# Patient Record
Sex: FEMALE | Race: WHITE | NOT HISPANIC OR LATINO | Employment: OTHER | URBAN - METROPOLITAN AREA
[De-identification: names, ages, dates, MRNs, and addresses within clinical notes are randomized per-mention and may not be internally consistent; named-entity substitution may affect disease eponyms.]

---

## 2018-05-22 PROBLEM — E03.9 ACQUIRED HYPOTHYROIDISM: Status: ACTIVE | Noted: 2018-05-22

## 2018-05-22 PROBLEM — G62.0 NEUROPATHY DUE TO CHEMOTHERAPEUTIC DRUG: Status: ACTIVE | Noted: 2018-05-22

## 2018-05-22 PROBLEM — I10 HYPERTENSION, WELL CONTROLLED: Status: ACTIVE | Noted: 2018-05-22

## 2018-05-22 PROBLEM — K64.9 HEMORRHOIDS: Status: ACTIVE | Noted: 2018-05-22

## 2018-05-22 PROBLEM — T45.1X5A NEUROPATHY DUE TO CHEMOTHERAPEUTIC DRUG: Status: ACTIVE | Noted: 2018-05-22

## 2018-05-22 PROBLEM — G47.00 INSOMNIA: Status: ACTIVE | Noted: 2018-05-22

## 2019-05-27 ENCOUNTER — CLINICAL SUPPORT (OUTPATIENT)
Dept: REHABILITATION | Facility: HOSPITAL | Age: 84
End: 2019-05-27
Payer: MEDICARE

## 2019-05-27 DIAGNOSIS — R29.898 DECREASED STRENGTH OF LOWER EXTREMITY: ICD-10-CM

## 2019-05-27 DIAGNOSIS — R26.89 BALANCE PROBLEM: ICD-10-CM

## 2019-05-27 PROCEDURE — 97163 PT EVAL HIGH COMPLEX 45 MIN: CPT | Mod: PN

## 2019-05-28 PROBLEM — R26.89 BALANCE PROBLEM: Status: ACTIVE | Noted: 2019-05-28

## 2019-05-28 PROBLEM — R29.898 DECREASED STRENGTH OF LOWER EXTREMITY: Status: ACTIVE | Noted: 2019-05-28

## 2019-05-29 NOTE — PLAN OF CARE
[facility] Outpatient Physical Therapy Initial Evaluation    Name: Antonia Maldonado  Clinic Number: [id number]    Antonia is a 88 y.o. female evaluated on 5/27/2019.     Diagnosis:   Encounter Diagnoses   Name Primary?    Decreased strength of lower extremity     Balance problem      Physician: Ezekiel Wang MD  Treatment Orders: PT Eval and Treat - Restore Functional ADL Training    Past Medical History:   Diagnosis Date    Adenocarcinoma, breast     Anemia     Anxiety and depression     Breast cancer     Depression     Hearing loss     Hypertension     Hypothyroidism     Sleep apnea      Review of patient's allergies indicates:  No Known Allergies  Precautions: Fall  Occupation: Retired     Envoirnmental concerns: none. Pt lives alone with one flight of stairs. Daugther and son each live within 10 minutes of her.   Cultural, Spiritual, Developmental and Educational concerns:none  Abuse/Neglect, Nutritional concerns: none    Subjective  Pt reports to clinic with B numbness to feet. She reports two falls off of the toilet and not sure if correlated with being tired or LE issues; she did hit her head each time with contusions but not MD visit. She reports feeling more secure while walking around grocery store.     Diagnostic Tests: none in chart for this condition    Pain Scale: Antonia denies pain.     Patient Goals: increase confidence. Prickling to legs would go away.     Objective  Observation: Pt is 88 year old WD, WN female who is alert and oriented x 3. No hearing aides today, difficulty hearing. Hearing aides are getting new batteries and she will have for next visit.     Posture: forward flexed at hips with decreased postural awareness.     MMT Hips  Flex R 5/5; L 4/5  Ext R 5/5; L 4/5  abd R 4+/5; 4-/5     MMT knees  Flex 5/5  Ext 5/5 but with cramping to B quads    MMT ankles  DF 4+/5 B    Alexandre Balance Scale: 47/56    Joint Mobility: not assessed  Palpation: none  Sensation: impaired to  light touch    PT Evaluation Completed? Yes  Discussed Plan of Care with patient: Yes    Written Home Exercises Provided: None today. Will distribute at next visit. (clamshell, supine march, heel raises)     History  Co-morbidities and personal factors that may impact the plan of care Examination  Body Structures and Functions, activity limitations and participation restrictions that may impact the plan of care    Clinical Presentation   Co-morbidities:   advanced age and HTN        Personal Factors:   no deficits Body Regions:   lower extremities    Body Systems:    strength  balance  gait            Participation Restrictions:   Incontinence issues require her appts to be 3 pm or after.      Activity limitations:   Learning and applying knowledge  no deficits    General Tasks and Commands  no deficits    Communication  hard of hearing if not wearing hearing aides    Mobility  walking    Self care  toileting  dressing    Domestic Life  doing house work (cleaning house, washing dishes, laundry)    Interactions/Relationships  no deficits    Life Areas  no deficits    Community and Social Life  community life         unstable clinical presentation with unpredictable characteristics                      high   high  high Decision Making/ Complexity Score:  high     CMS Impairment/Limitation/Restriction for FOTO Vertigo Survey  Status Limitation G-Code CMS Severity Modifier  Intake 56% 44% Current Status CK - At least 40 percent but less than 60 percent  Predicted 70% 30% Goal Status+ CJ - At least 20 percent but less than 40 percent    Assessment  This is a 88 y.o. female referred to outpatient physical therapy and presents with a physical therapy diagnosis of   Encounter Diagnoses   Name Primary?    Decreased strength of lower extremity     Balance problem     and demonstrates limitations as described in the problem list. Pt will benefit from physical therapy services in order to maximize pain free and/or functional  use of cervical spine. The following goals were discussed with the patient and patient is in agreement with them as to be addressed in the treatment plan.     Problem List: decreased strength and decreased balance and stability.    Short Term Goals:  4 weeks  1. Pt will present with increased hip strength on L by one half grade to improve stability with ambulation in home with decreased use of holding walls for stability when walking through home.  2. Pt will present with increased ankle strength into DF by one half grade for increased stability with ambulation  3. Pt will present with increased Alexandre balance scale score by 4 points for improved safety with ambulation.   4. Pt will report increased stability and decreased fear with ambulating in grocery store with decreased reliance on grocery cart.      Long Term Goals: 8 weeks  1. Pt will present with increased hip strength on L by one full grade to improve stability with ambulation in home with decreased use of holding walls for stability when walking through home.  2. Pt will present with increased ankle strength into DF by one full grade for increased stability with ambulation  3. Pt will present with increased Alexandre balance scale score by 8 points for improved safety with ambulation.   4. Pt will report increased confidence with walking in the community to grocery shop.     5. Pt will be independent with HEP and self management of symptoms.     Plan  Pt will be treated by physical therapy 2 times a week for 8 weeks for designated treatment time frame to address therapeutic exercises, stretching, stretching, balance, proprioception, pt education, home exercise program, and any other skilled physical therapy technique deemed necessary in order to achieve established goals. Antonia may at times be seen by a PTA as part of the Rehab Team.     Suzan Meneses, DPT      I certify the need for these services furnished under this plan of treatment and while under my care.          ___________________________________  Physician/Referring Practitioner        _________________  Date of Signature

## 2019-05-30 ENCOUNTER — CLINICAL SUPPORT (OUTPATIENT)
Dept: REHABILITATION | Facility: HOSPITAL | Age: 84
End: 2019-05-30
Payer: MEDICARE

## 2019-05-30 DIAGNOSIS — R26.89 BALANCE PROBLEM: ICD-10-CM

## 2019-05-30 DIAGNOSIS — R29.898 DECREASED STRENGTH OF LOWER EXTREMITY: ICD-10-CM

## 2019-05-30 PROCEDURE — 97110 THERAPEUTIC EXERCISES: CPT | Mod: PN

## 2019-05-30 NOTE — PROGRESS NOTES
"  Physical Therapy Daily Treatment Note     Name: Antonia Maldonado  Clinic Number: [id number]    Therapy Diagnosis:   Encounter Diagnoses   Name Primary?    Decreased strength of lower extremity     Balance problem      Physician: Ezekiel Wang MD    Visit Date: 5/30/2019  Physician Orders: PT Eval and Treat  Medical Diagnosis: Hypertension, Balance Disorder  Evaluation Date: 5/27/2019  Authorization Period Expiration: 12/31/2019  Plan of Care Certification Period: 7/22/2019  Visit #/Visits authorized: 2 / 20     Time In: 3:25 (arrived late due to getting lost)  Time Out: 4:10  Total Billable Time: 30 minutes  Total Treatment Time: 40 minute    Precautions: Fall, HTN    Subjective     Pt reports: tingling into B lower legs from the knees down. States she was told it is curable and is very excited about it. States she feels a little flustered currently because she got lost coming in to therapy today. Hearing aids are still being repaired and does not have them today, asks to be spoken to clearly and a little slower for best understanding.  She was compliant with home exercise program.  Response to previous treatment: no change as there were no exercises  Functional change: none    Pain: 4/10 tingling  Location: bilateral feet      Objective     Antonia received therapeutic exercises to develop strength, endurance, ROM and flexibility for 40 minutes including:  Supine glut set 10 x 5"  Clamshell supine with YTB x 10  Supine march x 10 each LE, alternating  Modified fig-4 piriformis stretch 3x20" each  Heel raises x 10  Sidesteps x 2 laps with UE support in // bars    To add next session: Neuromuscular re-education activities to improve: Balance, Coordination, Sense and Proprioception      Home Exercises Provided and Patient Education Provided     Education provided:   - emphasis on keeping track of time with stretches and repetitions with exercises to avoid over-straining or over-stretching.    Written Home Exercises " Provided: yes. See Patient Instructions for 05/31/2019 in Notes section of EMR.  Exercises were reviewed and Antonia was able to demonstrate them prior to the end of the session.  Antonia demonstrated good  understanding of the education provided.     Assessment     Antonia demonstrated overall good tolerance to treatment this date without exacerbation of symptoms. She continues with tingling into B lower legs throughout session with no changes of tingling. She required frequent cues to maintain proper technique and stay on task. Patient is verbose and occasionally strays off task, needing cues to resume exercise; this may be due to being more flustered from getting lost on the way to therapy. Patient presents with moderate LE weakness and will benefit from strength and balance training as tolerated.  Antonia is progressing well towards her goals.   Pt prognosis is Good.     Pt will continue to benefit from skilled outpatient physical therapy to address the deficits listed in the problem list box on initial evaluation, provide pt/family education and to maximize pt's level of independence in the home and community environment.     Pt's spiritual, cultural and educational needs considered and pt agreeable to plan of care and goals.    Anticipated barriers to physical therapy: none    Goals:   Short Term Goals:  4 weeks  1. Pt will present with increased hip strength on L by one half grade to improve stability with ambulation in home with decreased use of holding walls for stability when walking through home.  2. Pt will present with increased ankle strength into DF by one half grade for increased stability with ambulation  3. Pt will present with increased Alexandre balance scale score by 4 points for improved safety with ambulation.   4. Pt will report increased stability and decreased fear with ambulating in grocery store with decreased reliance on grocery cart.       Long Term Goals: 8 weeks  1. Pt will present with increased hip  strength on L by one full grade to improve stability with ambulation in home with decreased use of holding walls for stability when walking through home.  2. Pt will present with increased ankle strength into DF by one full grade for increased stability with ambulation  3. Pt will present with increased Alexandre balance scale score by 8 points for improved safety with ambulation.   4. Pt will report increased confidence with walking in the community to grocery shop.     5. Pt will be independent with HEP and self management of symptoms.     Plan     Pt to continue strength and balance training progression within current POC toward established PT goals.    Maureen Lambert, PTA

## 2019-06-04 ENCOUNTER — CLINICAL SUPPORT (OUTPATIENT)
Dept: REHABILITATION | Facility: HOSPITAL | Age: 84
End: 2019-06-04
Payer: MEDICARE

## 2019-06-04 DIAGNOSIS — R29.898 DECREASED STRENGTH OF LOWER EXTREMITY: ICD-10-CM

## 2019-06-04 DIAGNOSIS — R26.89 BALANCE PROBLEM: ICD-10-CM

## 2019-06-04 PROCEDURE — 97110 THERAPEUTIC EXERCISES: CPT | Mod: PN

## 2019-06-04 PROCEDURE — 97112 NEUROMUSCULAR REEDUCATION: CPT | Mod: PN

## 2019-06-04 NOTE — PROGRESS NOTES
"  Physical Therapy Daily Treatment Note     Name: Antonia Maldonado  Clinic Number: [id number]    Therapy Diagnosis:   Encounter Diagnoses   Name Primary?    Decreased strength of lower extremity     Balance problem      Physician: Ezekiel Wang MD    Visit Date: 6/4/2019  Physician Orders: PT Eval and Treat  Medical Diagnosis: Hypertension, Balance Disorder  Evaluation Date: 5/27/2019  Authorization Period Expiration: 12/31/2019  Plan of Care Certification Period: 7/22/2019  Visit #/Visits authorized: 3 / 20     Time In: 3:08  Time Out: 4:10  Total Billable Time: 30 minutes  Total Treatment Time: 60 minutes    Precautions: Fall, HTN    Subjective     Pt reports: continued tingling into B lower legs from the knees down. Arrives this date with some question as to why she is in PT, and if she should hold off until she sees her Internist, Dr. Granda. Upon discussion with patient in regards to benefits to PT and rationale to strengthen LE's as well as add balance training, patient understands importance to remain in PT as well as schedule a follow up with Dr. Granda. Also reports her hearing aids are still in the shop.  She was compliant with home exercise program.  Response to previous treatment: no change as there were no exercises  Functional change: none    Pain: 4/10 tingling  Location: bilateral feet      Objective     Antonia received therapeutic exercises to develop strength, endurance, ROM and flexibility for 50 minutes including:  Supine glut set 10 x 5"  Clamshell supine with YTB 2 x 10  Modified fig-4 piriformis stretch 3x20" each  Supine march x 10 each LE, alternating  Heel raises 2 x 10  Sidesteps x 3 laps with UE support in // bars    Discussion and education with patient in depth regarding the benefits and importance to strength and balance training in physical therapy including but not limited to, stability with walking, safety with navigating unsteady surfaces or dark rooms, endurance and strength with " daily activities like cooking, laundry, etc.    Pt received Neuromuscular re-education activities to improve: Balance, Coordination, Sense and Proprioception for 10 minutes including:  Normal IRENE on floor EO and EC, x 30 sec each  Narrow IRENE on floor EO and EC, x 30 sec each  Normal IRENE on airex foam EO x 30 sec  Normal IRENE on airex foam with UE flexion x 10      Home Exercises Provided and Patient Education Provided     Education provided:   - emphasis on keeping track of time with stretches and repetitions with exercises to avoid over-straining or over-stretching.    Written Home Exercises Provided: yes. See Patient Instructions for 06/04/2019 in Notes section of EMR.  Exercises were reviewed and Antonia was able to demonstrate them prior to the end of the session.  Antonia demonstrated good  understanding of the education provided.     Assessment     Antonia presents with improved tolerance to mat exercises this date however continues with appropriate muscle fatigue with all exercises. Pt with difficulty multi-tasking of counting repetitions and hold times, occasional verbal cues to maintain focal point with balance activities. Patient's distractions to surroundings may contribute to fall risk. No changes noted in lower leg tingling throughout session. Mild sway with narrow IRENE which increases with eyes closed while balancing. She will benefit from continued balance and strength training.   Antonia is progressing well towards her goals.   Pt prognosis is Good.     Pt will continue to benefit from skilled outpatient physical therapy to address the deficits listed in the problem list box on initial evaluation, provide pt/family education and to maximize pt's level of independence in the home and community environment.     Pt's spiritual, cultural and educational needs considered and pt agreeable to plan of care and goals.    Anticipated barriers to physical therapy: none    Goals:   Short Term Goals:  4 weeks  1. Pt will  present with increased hip strength on L by one half grade to improve stability with ambulation in home with decreased use of holding walls for stability when walking through home.  2. Pt will present with increased ankle strength into DF by one half grade for increased stability with ambulation  3. Pt will present with increased Alexandre balance scale score by 4 points for improved safety with ambulation.   4. Pt will report increased stability and decreased fear with ambulating in grocery store with decreased reliance on grocery cart.       Long Term Goals: 8 weeks  1. Pt will present with increased hip strength on L by one full grade to improve stability with ambulation in home with decreased use of holding walls for stability when walking through home.  2. Pt will present with increased ankle strength into DF by one full grade for increased stability with ambulation  3. Pt will present with increased Alexandre balance scale score by 8 points for improved safety with ambulation.   4. Pt will report increased confidence with walking in the community to grocery shop.     5. Pt will be independent with HEP and self management of symptoms.     Plan     Pt to continue strength and balance training progression within current POC toward established PT goals.    Maureen Lambert, PTA

## 2019-06-06 ENCOUNTER — CLINICAL SUPPORT (OUTPATIENT)
Dept: REHABILITATION | Facility: HOSPITAL | Age: 84
End: 2019-06-06
Payer: MEDICARE

## 2019-06-06 DIAGNOSIS — R29.898 DECREASED STRENGTH OF LOWER EXTREMITY: ICD-10-CM

## 2019-06-06 DIAGNOSIS — R26.89 BALANCE PROBLEM: ICD-10-CM

## 2019-06-06 PROCEDURE — 97110 THERAPEUTIC EXERCISES: CPT | Mod: PN

## 2019-06-06 NOTE — PROGRESS NOTES
"  Physical Therapy Daily Treatment Note     Name: Antonia Maldonado  Clinic Number: [id number]    Therapy Diagnosis:   Encounter Diagnoses   Name Primary?    Decreased strength of lower extremity     Balance problem      Physician: Ezekiel Wang MD    Visit Date: 6/6/2019  Physician Orders: PT Eval and Treat  Medical Diagnosis: Hypertension, Balance Disorder  Evaluation Date: 5/27/2019  Authorization Period Expiration: 12/31/2019  Plan of Care Certification Period: 7/22/2019  Visit #/Visits authorized: 4 / 20     Time In: 3:03  Time Out: 4:00  Total Billable Time: 30 minutes  Total Treatment Time: 55 minutes    Precautions: Fall, HTN    Subjective     Pt reports: overall doing alright today yet continues with tingling into the legs. Had minimal soreness after previous session which resolved within a day or so.   She was compliant with home exercise program.  Response to previous treatment: no change as there were no exercises  Functional change: none    Pain: 3-4/10 tingling  Location: bilateral feet      Objective     Antonia received therapeutic exercises to develop strength, endurance, ROM and flexibility for 50 minutes including: (possible to change exercises to times rather than repetitions for patient's better focus)  Supine glut set 10 x 5"  Modified fig-4 piriformis stretch 3x20" each  Supine march x 10 each LE, alternating  Clamshell supine with YTB 3 x 10  Hip add squeeze supine 20 x 3"  Seated LAQ x 1 min (small hold at each end range)  Heel raises 2 x 10  Sidesteps x 3 laps with UE support in // bars    Pt received Neuromuscular re-education activities to improve: Balance, Coordination, Sense and Proprioception for 5 minutes including:  Normal IRENE on floor EO and EC, x 30 sec each  Narrow IRENE on floor EO and EC, x 30 sec each  (NP) Normal IRENE on airex foam EO x 30 sec  (NP) Normal IRENE on airex foam with UE flexion x 10      Home Exercises Provided and Patient Education Provided     Education provided:   - " emphasis on keeping track of time with stretches and repetitions with exercises to avoid over-straining or over-stretching.    Written Home Exercises Provided: yes. See Patient Instructions for 06/06/2019 in Notes section of EMR.  Exercises were reviewed and Antonia was able to demonstrate them prior to the end of the session.  Antonia demonstrated good  understanding of the education provided.     Assessment     Antonia tolerated treatment well this date and was able to perform additional strengthening exercises without exacerbation of symptoms however did demonstrate mild muscular fatigue. No changes noted in lower leg tingling throughout session. Pt continues to require occasional cues for proper technique of mat exercises as well as to attempt to maintain proper counting repetitions. Mild sway with narrow IRENE which increases with eyes closed while balancing. She will benefit from continued balance and strength training.   Antonia is progressing well towards her goals.   Pt prognosis is Good.     Pt will continue to benefit from skilled outpatient physical therapy to address the deficits listed in the problem list box on initial evaluation, provide pt/family education and to maximize pt's level of independence in the home and community environment.     Pt's spiritual, cultural and educational needs considered and pt agreeable to plan of care and goals.    Anticipated barriers to physical therapy: none    Goals:   Short Term Goals:  4 weeks  1. Pt will present with increased hip strength on L by one half grade to improve stability with ambulation in home with decreased use of holding walls for stability when walking through home.  2. Pt will present with increased ankle strength into DF by one half grade for increased stability with ambulation  3. Pt will present with increased Alexandre balance scale score by 4 points for improved safety with ambulation.   4. Pt will report increased stability and decreased fear with ambulating  in grocery store with decreased reliance on grocery cart.       Long Term Goals: 8 weeks  1. Pt will present with increased hip strength on L by one full grade to improve stability with ambulation in home with decreased use of holding walls for stability when walking through home.  2. Pt will present with increased ankle strength into DF by one full grade for increased stability with ambulation  3. Pt will present with increased Alexandre balance scale score by 8 points for improved safety with ambulation.   4. Pt will report increased confidence with walking in the community to grocery shop.     5. Pt will be independent with HEP and self management of symptoms.     Plan     Pt to continue strength and balance training progression within current POC toward established PT goals.    Maureen Lambert, PTA

## 2019-06-11 ENCOUNTER — CLINICAL SUPPORT (OUTPATIENT)
Dept: REHABILITATION | Facility: HOSPITAL | Age: 84
End: 2019-06-11
Payer: MEDICARE

## 2019-06-11 DIAGNOSIS — R26.89 BALANCE PROBLEM: ICD-10-CM

## 2019-06-11 DIAGNOSIS — R29.898 DECREASED STRENGTH OF LOWER EXTREMITY: ICD-10-CM

## 2019-06-11 PROCEDURE — 97112 NEUROMUSCULAR REEDUCATION: CPT | Mod: PN

## 2019-06-11 PROCEDURE — 97110 THERAPEUTIC EXERCISES: CPT | Mod: PN

## 2019-06-11 NOTE — PROGRESS NOTES
"  Physical Therapy Daily Treatment Note     Name: Antonia Maldonado  Clinic Number: [id number]    Therapy Diagnosis:   Encounter Diagnoses   Name Primary?    Decreased strength of lower extremity     Balance problem      Physician: Ezekiel Wang MD    Visit Date: 6/11/2019  Physician Orders: PT Eval and Treat  Medical Diagnosis: Hypertension, Balance Disorder  Evaluation Date: 5/27/2019  Authorization Period Expiration: 12/31/2019  Plan of Care Certification Period: 7/22/2019  Visit #/Visits authorized: 5 / 20     Time In: 3:05  Time Out: 4:00  Total Billable Time: 55 minutes  Total Treatment Time: 55 minutes    Precautions: Fall, HTN    Subjective     Pt reports: doing well today overall. No change of LE tingling. States her hearing aids are ready she just has to pick them up from the store. States she plans to be better about her home exercises starting tomorrow by keeping a chart of days to do them.  She was NOT compliant with home exercise program.  Response to previous treatment: no change as there were no exercises  Functional change: none    Pain: 4/10 tingling  Location: bilateral feet      Objective     Antonia Beltrán received therapeutic exercises to develop strength, endurance, ROM and flexibility for 45 minutes including: (change exercises to times rather than repetitions for patient's better focus)  Supine glut set 10 x 5"  Modified fig-4 piriformis stretch 3x20" each  Supine march 2 x 1 min, alternating  Uni Clamshell supine with YTB x 1 min each leg  Hip add squeeze supine 2 x 1 min  Seated LAQ x 1 min each leg (small hold at each end range)  Heel raises 2 x 1 min  Sidesteps x 3 laps with UE support in // bars   Gastroc stretch standing in // bars 3x20" each    Possible for next session: mini squat    Pt received Neuromuscular re-education activities to improve: Balance, Coordination, Sense and Proprioception for 15 minutes including:  Normal IRENE on floor EC, 2 x 30 sec each  Narrow IRENE on floor EO and EC, 2 x " 30 sec each  Normal IRENE on airex foam EO x 30 sec  Narrow IRENE on airex foam EO x 30 sec  Narrow IRENE on airex foam with UE flexion x 10  Modified Tandem balance x 30 sec with each foot forward - more difficult with L LE back    Home Exercises Provided and Patient Education Provided     Education provided:   - emphasis on keeping track of time with stretches and repetitions with exercises to avoid over-straining or over-stretching.    Written Home Exercises Provided: yes. See Patient Instructions for 06/11/2019 in Notes section of EMR.  Exercises were reviewed and Antonia Beltrán was able to demonstrate them prior to the end of the session.  Antonia Beltrán demonstrated good  understanding of the education provided.     Assessment     Antonia presents with improved strength and endurance this date overall. She continues with mild instability with narrow IRENE and modified tandem balance exercises. Pt required near constant supervision with all exercises to maintain proper technique. Re-explanation of most exercises due to patient with poor memory of exercises and non-compliance to HEP. No changes noted of tingling to B LE's. Change of repetitions to timed exercises this date due to patient being unable to keep track of reps.  Antonia Beltrán is progressing well towards her goals.   Pt prognosis is Good.     Pt will continue to benefit from skilled outpatient physical therapy to address the deficits listed in the problem list box on initial evaluation, provide pt/family education and to maximize pt's level of independence in the home and community environment.     Pt's spiritual, cultural and educational needs considered and pt agreeable to plan of care and goals.    Anticipated barriers to physical therapy: none    Goals:   Short Term Goals:  4 weeks  1. Pt will present with increased hip strength on L by one half grade to improve stability with ambulation in home with decreased use of holding walls for stability when walking through home.  2.  Pt will present with increased ankle strength into DF by one half grade for increased stability with ambulation  3. Pt will present with increased Alexandre balance scale score by 4 points for improved safety with ambulation.   4. Pt will report increased stability and decreased fear with ambulating in grocery store with decreased reliance on grocery cart.       Long Term Goals: 8 weeks  1. Pt will present with increased hip strength on L by one full grade to improve stability with ambulation in home with decreased use of holding walls for stability when walking through home.  2. Pt will present with increased ankle strength into DF by one full grade for increased stability with ambulation  3. Pt will present with increased Alexandre balance scale score by 8 points for improved safety with ambulation.   4. Pt will report increased confidence with walking in the community to grocery shop.     5. Pt will be independent with HEP and self management of symptoms.     Plan     Pt to continue strength and balance training progression within current POC toward established PT goals.    Maureen Lambert, PTA

## 2019-06-13 ENCOUNTER — CLINICAL SUPPORT (OUTPATIENT)
Dept: REHABILITATION | Facility: HOSPITAL | Age: 84
End: 2019-06-13
Payer: MEDICARE

## 2019-06-13 DIAGNOSIS — R26.89 BALANCE PROBLEM: ICD-10-CM

## 2019-06-13 DIAGNOSIS — R29.898 DECREASED STRENGTH OF LOWER EXTREMITY: ICD-10-CM

## 2019-06-13 PROCEDURE — 97112 NEUROMUSCULAR REEDUCATION: CPT | Mod: PN

## 2019-06-13 PROCEDURE — 97110 THERAPEUTIC EXERCISES: CPT | Mod: PN

## 2019-06-13 NOTE — PROGRESS NOTES
"  Physical Therapy Daily Treatment Note     Name: Antonia Maldonado  Clinic Number: [id number]    Therapy Diagnosis:   Encounter Diagnoses   Name Primary?    Decreased strength of lower extremity     Balance problem      Physician: Ezekiel Wang MD    Visit Date: 6/13/2019  Physician Orders: PT Eval and Treat  Medical Diagnosis: Hypertension, Balance Disorder  Evaluation Date: 5/27/2019  Authorization Period Expiration: 12/31/2019  Plan of Care Certification Period: 7/22/2019  Visit #/Visits authorized: 6 / 20     Time In: 3:05  Time Out: 4:00  Total Billable Time: 55 minutes  Total Treatment Time: 55 minutes    Precautions: Fall, HTN    Subjective     Pt reports: her legs feel a little sleeping today, like pins and needles. She thinks her appts shoulder be a little further apart. She reports hip hurting too much to do exercises at home.   She was NOT compliant with home exercise program.  Response to previous treatment: no change as there were no exercises  Functional change: none    Pain: 4/10 tingling  Location: bilateral feet      Objective     Antonia Beltrán received therapeutic exercises to develop strength, endurance, ROM and flexibility for 15 minutes including: (change exercises to times rather than repetitions for patient's better focus)  Seated lumbar flexion forward and lateral each side x 10 each with 10 second hold  Supine glut set 10 x 5"  Supine march 2 x 1 min, alternating  Clamshell x 1 min each leg  Heel raises 2 x 1 min    NP:  Sidesteps x 3 laps with UE support in // bars   Modified fig-4 piriformis stretch 3x20" each  Hip add squeeze supine 2 x 1 min  Seated LAQ x 1 min each leg (small hold at each end range)  Gastroc stretch standing in // bars 3x20" each    Possible for next session: mini squat    Pt received Neuromuscular re-education activities to improve: Balance, Coordination, Sense and Proprioception for 15 minutes including:  Normal IRENE on airex foam EO x 30 sec  Normal NOS on airex foam with EC " x   Narrow IRENE on airex foam EO x 30 sec  Narrow IRENE on airex foam with UE flexion x 10  Modified Tandem balance x 30 sec with each foot forward - more difficult with L LE back    Home Exercises Provided and Patient Education Provided     Education provided:   - emphasis on keeping track of time with stretches and repetitions with exercises to avoid over-straining or over-stretching.    Written Home Exercises Provided: yes. See Patient Instructions for 06/13/2019 in Notes section of EMR.  Exercises were reviewed and Antonia Beltrán was able to demonstrate them prior to the end of the session.  Antonia Beltrán demonstrated good  understanding of the education provided.     Assessment     Antonia performed seated lumbar flexion to improve mobility to lumbar spine and performed without reports of increased symptoms. She required cues with balance exercises and demonstrates mild to moderate sway with all exercises.   Antonia Beltrán is progressing well towards her goals.   Pt prognosis is Good.     Pt will continue to benefit from skilled outpatient physical therapy to address the deficits listed in the problem list box on initial evaluation, provide pt/family education and to maximize pt's level of independence in the home and community environment.     Pt's spiritual, cultural and educational needs considered and pt agreeable to plan of care and goals.    Anticipated barriers to physical therapy: none    Goals:   Short Term Goals:  4 weeks  1. Pt will present with increased hip strength on L by one half grade to improve stability with ambulation in home with decreased use of holding walls for stability when walking through home.  2. Pt will present with increased ankle strength into DF by one half grade for increased stability with ambulation  3. Pt will present with increased Alexandre balance scale score by 4 points for improved safety with ambulation.   4. Pt will report increased stability and decreased fear with ambulating in grocery store  with decreased reliance on grocery cart.       Long Term Goals: 8 weeks  1. Pt will present with increased hip strength on L by one full grade to improve stability with ambulation in home with decreased use of holding walls for stability when walking through home.  2. Pt will present with increased ankle strength into DF by one full grade for increased stability with ambulation  3. Pt will present with increased Alexandre balance scale score by 8 points for improved safety with ambulation.   4. Pt will report increased confidence with walking in the community to grocery shop.     5. Pt will be independent with HEP and self management of symptoms.     Plan     Pt to continue strength and balance training progression within current POC toward established PT goals.    Nelda Meneses, PT

## 2019-06-18 ENCOUNTER — CLINICAL SUPPORT (OUTPATIENT)
Dept: REHABILITATION | Facility: HOSPITAL | Age: 84
End: 2019-06-18
Payer: MEDICARE

## 2019-06-18 DIAGNOSIS — R29.898 DECREASED STRENGTH OF LOWER EXTREMITY: ICD-10-CM

## 2019-06-18 DIAGNOSIS — R26.89 BALANCE PROBLEM: ICD-10-CM

## 2019-06-18 PROCEDURE — 97750 PHYSICAL PERFORMANCE TEST: CPT | Mod: PN

## 2019-06-18 PROCEDURE — 97110 THERAPEUTIC EXERCISES: CPT | Mod: PN

## 2019-06-18 PROCEDURE — 97112 NEUROMUSCULAR REEDUCATION: CPT | Mod: PN

## 2019-06-18 NOTE — PROGRESS NOTES
"  Physical Therapy Daily Treatment Note     Name: Antonia Maldonado  Clinic Number: [id number]    Therapy Diagnosis:   No diagnosis found.  Physician: Ezekiel Wang MD    Visit Date: 6/18/2019  Physician Orders: PT Eval and Treat  Medical Diagnosis: Hypertension, Balance Disorder  Evaluation Date: 5/27/2019  Authorization Period Expiration: 12/31/2019  Plan of Care Certification Period: 7/22/2019  Visit #/Visits authorized: 7 / 20     Time In: 4:10   Time Out: 4:00  Total Billable Time: 55 minutes  Total Treatment Time: 55 minutes    Precautions: Fall, HTN    Subjective     Pt reports: she is very tired today and did not get enough sleep. She feels her legs are still tingly. She purchased a new pair of [employer] tennis shoes and feels a difference.   She was NOT compliant with home exercise program.  Response to previous treatment: no change as there were no exercises  Functional change: none    Pain: 4/10 tingling  Location: bilateral feet      Objective     Alexandre Balance Scale performed today: 50/56    MMT Hips  Flex R 5/5; L 4+/5  Ext R 5/5; L 4+/5  abd R 4+/5; L4+/5      MMT knees  Flex 5/5  Ext 5/5 but with cramping to B quads     MMT ankles   B    Antonia Beltrán received therapeutic exercises to develop strength, endurance, ROM and flexibility for 15 minutes including: (change exercises to times rather than repetitions for patient's better focus)  Seated lumbar flexion forward and lateral each side x 10 each with 10 second hold  Supine march 2 x 1 min, alternating  Hip add squeeze supine 2 x 1 min  Clamshell x 1 min each leg  Sidesteps x 3 laps with UE support in // bars   Heel raises x 1 min  Mini squat x 10    NP:  Modified fig-4 piriformis stretch 3x20" each  Seated LAQ x 1 min each leg (small hold at each end range)  Gastroc stretch standing in // bars 3x20" each    Pt received Neuromuscular re-education activities to improve: Balance, Coordination, Sense and Proprioception for 10 minutes including:  Normal IRENE on " airex foam EO x 30 sec  Normal NOS on airex foam with EC x 11 seconds, 19 seconds  Narrow IRENE on airex foam EO x 30 sec  Narrow IRENE on airex foam with UE flexion x 10  Tandem balance x 30 sec with each foot forward - more difficult with L LE back    Home Exercises Provided and Patient Education Provided     Education provided:   - emphasis on keeping track of time with stretches and repetitions with exercises to avoid over-straining or over-stretching.    Written Home Exercises Provided: yes. See Patient Instructions for 06/18/2019 in Notes section of EMR.  Exercises were reviewed and Antonia Beltrán was able to demonstrate them prior to the end of the session.  Antonia Beltrán demonstrated good  understanding of the education provided.     Assessment     Pt presents with improved Alexandre score and hip strength since initial evaluation. She progressed with performing tandem with 19 second hold.   Antonia Beltrán is progressing well towards her goals.   Pt prognosis is Good.     Pt will continue to benefit from skilled outpatient physical therapy to address the deficits listed in the problem list box on initial evaluation, provide pt/family education and to maximize pt's level of independence in the home and community environment.     Pt's spiritual, cultural and educational needs considered and pt agreeable to plan of care and goals.    Anticipated barriers to physical therapy: none    Goals:   Short Term Goals:  4 weeks  1. Pt will present with increased hip strength on L by one half grade to improve stability with ambulation in home with decreased use of holding walls for stability when walking through home.  2. Pt will present with increased ankle strength into DF by one half grade for increased stability with ambulation  3. Pt will present with increased Alexandre balance scale score by 4 points for improved safety with ambulation.   4. Pt will report increased stability and decreased fear with ambulating in grocery store with decreased  reliance on grocery cart.       Long Term Goals: 8 weeks  1. Pt will present with increased hip strength on L by one full grade to improve stability with ambulation in home with decreased use of holding walls for stability when walking through home.  2. Pt will present with increased ankle strength into DF by one full grade for increased stability with ambulation  3. Pt will present with increased Alexandre balance scale score by 8 points for improved safety with ambulation.   4. Pt will report increased confidence with walking in the community to grocery shop.     5. Pt will be independent with HEP and self management of symptoms.     Plan     Pt to continue strength and balance training progression within current POC toward established PT goals.    Nelda Meneses, PT

## 2019-06-24 ENCOUNTER — CLINICAL SUPPORT (OUTPATIENT)
Dept: REHABILITATION | Facility: HOSPITAL | Age: 84
End: 2019-06-24
Payer: MEDICARE

## 2019-06-24 DIAGNOSIS — R26.89 BALANCE PROBLEM: ICD-10-CM

## 2019-06-24 DIAGNOSIS — R29.898 DECREASED STRENGTH OF LOWER EXTREMITY: ICD-10-CM

## 2019-06-24 PROCEDURE — 97110 THERAPEUTIC EXERCISES: CPT | Mod: PN

## 2019-06-24 PROCEDURE — 97112 NEUROMUSCULAR REEDUCATION: CPT | Mod: PN

## 2019-06-24 NOTE — PROGRESS NOTES
"  Physical Therapy Daily Treatment Note     Name: Antonia Maldonado  Clinic Number: [id number]    Therapy Diagnosis:   Encounter Diagnoses   Name Primary?    Decreased strength of lower extremity     Balance problem      Physician: Ezekiel Wang MD    Visit Date: 6/24/2019  Physician Orders: PT Eval and Treat  Medical Diagnosis: Hypertension, Balance Disorder  Evaluation Date: 5/27/2019  Authorization Period Expiration: 12/31/2019  Plan of Care Certification Period: 7/22/2019  Visit #/Visits authorized: 8 / 20     Time In: 3:05   Time Out: 4:01  Total Billable Time: 55 minutes  Total Treatment Time: 55 minutes    Precautions: Fall, HTN    Subjective     Pt reports: feeling not 100% today due to having a UTI, which she is on antibiotics for. States she would like to progress her exercises at home as they are becoming easier. States her son has noticed that she is moving around a little easier and faster.  She was NOT compliant with home exercise program.  Response to previous treatment: no change as there were no exercises  Functional change: none    Pain: 4/10 tingling  Location: bilateral feet      Objective       Antonia Beltrán received therapeutic exercises to develop strength, endurance, ROM and flexibility for 45 minutes including: (change exercises to times rather than repetitions for patient's better focus)  Seated lumbar flexion forward and lateral each side x 10 each with 10 second hold  Supine march 2 x 1 min, alternating  Bridge x 10 (cues for TA and glut set)  Hip add squeeze supine x 2 min  Clamshell x 1 min each leg  Seated LAQ x 1 min each leg (small hold at each end range)  Sidesteps x 2 laps without UE support in // bars (cues for hands on hips)  Heel raises x 1 min  Mini squat x 1 min    NP:  Modified fig-4 piriformis stretch 3x20" each  Gastroc stretch standing in // bars 3x20" each    Pt received Neuromuscular re-education activities to improve: Balance, Coordination, Sense and Proprioception for 10 " minutes including:  Normal IRENE on airex foam EO x 30 sec  Normal IRENE on airex foam with EC x 30 sec, occasional quick tap on bar for balance (3 times)  Normal IRENE on foam with head nods and head turns, x 30 sec each  Narrow IRENE on airex foam EO x 30 sec  Narrow IRENE on airex foam with UE flexion x 10  Tandem balance x 30 sec with each foot forward - more difficult with L LE back    Home Exercises Provided and Patient Education Provided     Education provided:   - emphasis on keeping track of time with stretches and repetitions with exercises to avoid over-straining or over-stretching.    Written Home Exercises Provided: yes. See Patient Instructions for 06/24/2019 in Notes section of EMR.  Exercises were reviewed and Antonia Beltrán was able to demonstrate them prior to the end of the session.  Antonia Beltrán demonstrated good  understanding of the education provided.     Assessment     Pt demonstrated overall good tolerance to treatment and additional exercises this date. Pt able to perform sidesteps without UE assistance and improved awareness for proper technique and pace of mini squats and sidesteps. She presents with continued gluteal weakness with bridges and clamshells yet overall strength and endurance is improving.   Antonia Beltrán is progressing well towards her goals.   Pt prognosis is Good.     Pt will continue to benefit from skilled outpatient physical therapy to address the deficits listed in the problem list box on initial evaluation, provide pt/family education and to maximize pt's level of independence in the home and community environment.     Pt's spiritual, cultural and educational needs considered and pt agreeable to plan of care and goals.    Anticipated barriers to physical therapy: none    Goals:   Short Term Goals:  4 weeks  1. Pt will present with increased hip strength on L by one half grade to improve stability with ambulation in home with decreased use of holding walls for stability when walking through  home.  2. Pt will present with increased ankle strength into DF by one half grade for increased stability with ambulation  3. Pt will present with increased Alexandre balance scale score by 4 points for improved safety with ambulation.   4. Pt will report increased stability and decreased fear with ambulating in grocery store with decreased reliance on grocery cart.       Long Term Goals: 8 weeks  1. Pt will present with increased hip strength on L by one full grade to improve stability with ambulation in home with decreased use of holding walls for stability when walking through home.  2. Pt will present with increased ankle strength into DF by one full grade for increased stability with ambulation  3. Pt will present with increased Alexandre balance scale score by 8 points for improved safety with ambulation.   4. Pt will report increased confidence with walking in the community to grocery shop.     5. Pt will be independent with HEP and self management of symptoms.     Plan     Pt to continue strength and balance training progression within current POC toward established PT goals.    Maureen Lambert, PTA

## 2019-07-03 ENCOUNTER — CLINICAL SUPPORT (OUTPATIENT)
Dept: REHABILITATION | Facility: HOSPITAL | Age: 84
End: 2019-07-03
Payer: MEDICARE

## 2019-07-03 DIAGNOSIS — R29.898 DECREASED STRENGTH OF LOWER EXTREMITY: ICD-10-CM

## 2019-07-03 DIAGNOSIS — R26.89 BALANCE PROBLEM: ICD-10-CM

## 2019-07-03 PROCEDURE — 97110 THERAPEUTIC EXERCISES: CPT | Mod: PN

## 2019-07-03 PROCEDURE — 97112 NEUROMUSCULAR REEDUCATION: CPT | Mod: PN

## 2019-07-03 NOTE — PROGRESS NOTES
"  Physical Therapy Daily Treatment Note     Name: Antonia Maldonado  Clinic Number: [id number]    Therapy Diagnosis:   Encounter Diagnoses   Name Primary?    Decreased strength of lower extremity     Balance problem      Physician: Ezekiel Wang MD    Visit Date: 7/3/2019  Physician Orders: PT Eval and Treat  Medical Diagnosis: Hypertension, Balance Disorder  Evaluation Date: 5/27/2019  Authorization Period Expiration: 12/31/2019  Plan of Care Certification Period: 7/22/2019  Visit #/Visits authorized: 9 / 20     Time In: 4:00   Time Out: 5:05  Total Billable Time: 60 minutes  Total Treatment Time: 60 minutes    Precautions: Fall, HTN    Subjective     Pt reports: feeling not 100% today due to a flare up of a stomach issue she's had for a long time. States she almost cancelled today but wanted to come and do some exercises.  She was compliant with home exercise program.  Response to previous treatment: no change as there were no exercises  Functional change: none    Pain: 4/10 tingling  Location: bilateral feet      Objective       Antonia Beltrán received therapeutic exercises to develop strength, endurance, ROM and flexibility for 45 minutes including: (change exercises to times rather than repetitions for patient's better focus)  Seated lumbar flexion forward only due to patient confusion with the stretch x 10 each with 10 second hold  Supine march 2 x 1 min, alternating  Bridge x 10 (cues for TA and glut set)  Hip add squeeze supine x 2 min  Clamshell x 1 min each leg  Seated LAQ x 1 min each leg (small hold at each end range)  Sidesteps x 2 laps without UE support in // bars (cues for hands on hips)  Heel raises x 1 min  (NP) Mini squat x 1 min  Sit-stand from lowered mat (cues for proper COG) - patient showed confusion with change of task, maybe return to mini squats next session    NP:  Modified fig-4 piriformis stretch 3x20" each  Gastroc stretch standing in // bars 3x20" each    Possible for next session:  Shuttle " press B LE and U LE    Pt received Neuromuscular re-education activities to improve: Balance, Coordination, Sense and Proprioception for 10 minutes including:  Normal IRENE on airex foam EO x 30 sec  Normal IRENE on airex foam with EC x 30 sec, occasional quick tap on bar for balance (3 times)  Normal IRENE on foam with head nods and head turns, x 30 sec each  Narrow IRENE on airex foam EO x 30 sec  Narrow IRENE on airex foam with UE flexion x 10  Tandem balance x 30 sec with each foot forward     Home Exercises Provided and Patient Education Provided     Education provided:   - emphasis on keeping track of time with stretches and repetitions with exercises to avoid over-straining or over-stretching.    Written Home Exercises Provided: yes. See Patient Instructions for 07/03/2019 in Notes section of EMR.  Exercises were reviewed and Antonia Beltrán was able to demonstrate them prior to the end of the session.  Antonia Beltrán demonstrated good  understanding of the education provided.     Assessment     Pt with overall good tolerance to treatment this date. Some confusion was noted this date with change of mini squat to sit-stand for greater quad strength, however was able to perform the exercise with good technique but max cues for sequencing to maintain COG over feet. Mild onset of dizziness with head nods/turns on airex foam, but alleviated with rest. Balance and stability overall is improving and will benefit from continued treatment.  Antonia Beltrán is progressing well towards her goals.   Pt prognosis is Good.     Pt will continue to benefit from skilled outpatient physical therapy to address the deficits listed in the problem list box on initial evaluation, provide pt/family education and to maximize pt's level of independence in the home and community environment.     Pt's spiritual, cultural and educational needs considered and pt agreeable to plan of care and goals.    Anticipated barriers to physical therapy: none    Goals:   Short  Term Goals:  4 weeks  1. Pt will present with increased hip strength on L by one half grade to improve stability with ambulation in home with decreased use of holding walls for stability when walking through home.  2. Pt will present with increased ankle strength into DF by one half grade for increased stability with ambulation  3. Pt will present with increased Alexandre balance scale score by 4 points for improved safety with ambulation.   4. Pt will report increased stability and decreased fear with ambulating in grocery store with decreased reliance on grocery cart.       Long Term Goals: 8 weeks  1. Pt will present with increased hip strength on L by one full grade to improve stability with ambulation in home with decreased use of holding walls for stability when walking through home.  2. Pt will present with increased ankle strength into DF by one full grade for increased stability with ambulation  3. Pt will present with increased Alexandre balance scale score by 8 points for improved safety with ambulation.   4. Pt will report increased confidence with walking in the community to grocery shop.     5. Pt will be independent with HEP and self management of symptoms.     Plan     Pt to continue strength and balance training progression within current POC toward established PT goals.    Maureen Lambert, PTA

## 2019-07-09 ENCOUNTER — CLINICAL SUPPORT (OUTPATIENT)
Dept: REHABILITATION | Facility: HOSPITAL | Age: 84
End: 2019-07-09
Payer: MEDICARE

## 2019-07-09 DIAGNOSIS — R29.898 DECREASED STRENGTH OF LOWER EXTREMITY: ICD-10-CM

## 2019-07-09 DIAGNOSIS — R26.89 BALANCE PROBLEM: ICD-10-CM

## 2019-07-09 PROCEDURE — 97110 THERAPEUTIC EXERCISES: CPT | Mod: PN

## 2019-07-09 PROCEDURE — 97112 NEUROMUSCULAR REEDUCATION: CPT | Mod: PN

## 2019-07-09 NOTE — PROGRESS NOTES
Physical Therapy Daily Treatment Note     Name: Antonia Maldonado  Clinic Number: [id number]    Therapy Diagnosis:   Encounter Diagnoses   Name Primary?    Decreased strength of lower extremity     Balance problem      Physician: Ezekiel Wang MD    Visit Date: 7/9/2019  Physician Orders: PT Eval and Treat  Medical Diagnosis: Hypertension, Balance Disorder  Evaluation Date: 5/27/2019  Authorization Period Expiration: 12/31/2019  Plan of Care Certification Period: 7/22/2019  Visit #/Visits authorized: 10 / 20     Time In: 4:02 PM   Time Out: 5:00 PM  Total Billable Time: 58 minutes  Total Treatment Time: 58 minutes    Precautions: Fall, HTN    Subjective     Pt reports: that she is continuing to have numbness and tingling from her knees down. She is considering scheduling a doctor's appointment to let them examine things further. No pain complaints. She continues to perform her home exercises and really feels like those are helping her.  She was compliant with home exercise program.  Response to previous treatment: Able to sit down and stand up from a chair with decreased difficulty  Functional change: none    Pain: 4/10 tingling  Location: bilateral legs (knees down to feet only)    Objective      Antonia Beltrán received therapeutic exercises to develop strength, endurance, ROM and flexibility for 48 minutes including: (change exercises to times rather than repetitions for patient's better focus)    Seated lumbar flexion forward 10x10 sec  Supine march 2 x 1 min, alternating  Bridge x 10 (cues for TA and glut set)  Hip add squeeze supine x 2 min  Clamshells with YTB x 2 min   Seated LAQ x 1 min each leg (small hold at each end range) #1  Seated Hamstring curls with RTB x1 min B  Sidesteps x 2 laps without UE support in // bars (cues for hands on hips)  Heel raises x 2 min  Sit-stand from chair x10 reps 2x10  Standing Hip Abduction x2 min  Standing Hip Extension x2 min  Shuttle Press BLE x15 reps   Shuttle Press ULE x10  "reps B    NP:  Modified fig-4 piriformis stretch 3x20" each  Gastroc stretch standing in // bars 3x20" each  (NP) Mini squat x 1 min      Pt received Neuromuscular re-education activities to improve: Balance, Coordination, Sense and Proprioception for 10 minutes including:  Normal IRENE on airex foam EO x 30 sec  Normal IRENE on airex foam with EC x 30 sec  Normal IRENE on foam with head nods and head turns, x 30 sec each  Narrow IRENE on airex foam EO x 30 sec  Narrow IRENE on airex foam with UE flexion x 10  Tandem balance x 30 sec with each foot forward     Home Exercises Provided and Patient Education Provided     Education provided:   - emphasis on keeping track of time with stretches and repetitions with exercises.   - Importance of maintaining HEP    Written Home Exercises Provided: yes. See Patient Instructions for 07/09/2019 in Notes section of EMR.  Exercises were reviewed and Antonia Beltrán was able to demonstrate them prior to the end of the session.  Antonia Beltrán demonstrated good  understanding of the education provided.     Assessment     Pt tolerated therapy well this afternoon. Intermittent cueing required for TA contraction combined with bridges. VC's required for prevention of trunk compensation during standing hip extension and hip abduction. Good quad contraction with seated LAQ's. Moderate ankle sway noted with eyes closed on airex foam. Increased difficulty observed with narrow IRENE during balance exercises. Able to self correct with minimal UE assist. Tandem balance was the most difficult and required the most UE assistance. Pt consistently required cueing for correct performance of exercises throughout session, but demonstrated good performance overall. Pt demonstrated good LE strength on shuttle. Plan to continue to progress pt next session.    Antonia Beltrán is progressing well towards her goals.   Pt prognosis is Good.     Pt will continue to benefit from skilled outpatient physical therapy to address the deficits " listed in the problem list box on initial evaluation, provide pt/family education and to maximize pt's level of independence in the home and community environment.     Pt's spiritual, cultural and educational needs considered and pt agreeable to plan of care and goals.    Anticipated barriers to physical therapy: none    Goals:   Short Term Goals:  4 weeks  1. Pt will present with increased hip strength on L by one half grade to improve stability with ambulation in home with decreased use of holding walls for stability when walking through home. (progressing, not met)  2. Pt will present with increased ankle strength into DF by one half grade for increased stability with ambulation (progressing, not met)  3. Pt will present with increased Alexandre balance scale score by 4 points for improved safety with ambulation. (progressing, not met)  4. Pt will report increased stability and decreased fear with ambulating in grocery store with decreased reliance on grocery cart. (progressing, not met)     Long Term Goals: 8 weeks  1. Pt will present with increased hip strength on L by one full grade to improve stability with ambulation in home with decreased use of holding walls for stability when walking through home. (progressing, not met)  2. Pt will present with increased ankle strength into DF by one full grade for increased stability with ambulation (progressing, not met)  3. Pt will present with increased Alexandre balance scale score by 8 points for improved safety with ambulation. (progressing, not met)  4. Pt will report increased confidence with walking in the community to grocery shop.  (progressing, not met)  5. Pt will be independent with HEP and self management of symptoms. (progressing, not met)    Plan     Pt to continue strength and balance training progression within current POC toward established PT goals.    Clarisa Cunha, PT, DPT

## 2019-07-15 ENCOUNTER — CLINICAL SUPPORT (OUTPATIENT)
Dept: REHABILITATION | Facility: HOSPITAL | Age: 84
End: 2019-07-15
Payer: MEDICARE

## 2019-07-15 DIAGNOSIS — R26.89 BALANCE PROBLEM: ICD-10-CM

## 2019-07-15 DIAGNOSIS — R29.898 DECREASED STRENGTH OF LOWER EXTREMITY: ICD-10-CM

## 2019-07-15 PROCEDURE — 97110 THERAPEUTIC EXERCISES: CPT | Mod: PN

## 2019-07-15 PROCEDURE — 97112 NEUROMUSCULAR REEDUCATION: CPT | Mod: PN

## 2019-07-15 NOTE — PROGRESS NOTES
Physical Therapy Daily Treatment Note     Name: Antonia Maldonado  Clinic Number: [id number]    Therapy Diagnosis:   Encounter Diagnoses   Name Primary?    Decreased strength of lower extremity     Balance problem      Physician: Ezekiel Wang MD    Visit Date: 7/15/2019  Physician Orders: PT Eval and Treat  Medical Diagnosis: Hypertension, Balance Disorder  Evaluation Date: 5/27/2019  Authorization Period Expiration: 12/31/2019  Plan of Care Certification Period: 7/22/2019  Visit #/Visits authorized: 11 / 20     Time In: 3:07 PM (patient arrived late)  Time Out: 4:07 PM  Total Billable Time: 60 minutes  Total Treatment Time: 60 minutes    Precautions: Fall, HTN    Subjective     Pt reports: some soreness following her previous session but it only lasted about a day. Feels she may be noticing an increase of endurance throughout the day but is not sure.   She was compliant with home exercise program.  Response to previous treatment: Able to sit down and stand up from a chair with decreased difficulty  Functional change: none    Pain: 4/10 tingling  Location: bilateral legs (knees down to feet only)    Objective      Antonia Beltrán received therapeutic exercises to develop strength, endurance, ROM and flexibility for 50 minutes including: (change exercises to times rather than repetitions for patient's better focus)    Seated lumbar flexion forward 10x10 sec  Seated LAQ x 1 min each leg (small hold at each end range) #1  Supine march 2 x 1 min, alternating 1# cuff weight on ankles  Bridge 2 x 1 min (cues for TA and glut set)  Hip add squeeze supine x 2 min  Clamshells with RTB x 2 min   (NP) Seated Hamstring curls with RTB x1 min B  Sit-stand from chair 2x10 reps   Sidesteps x 2 laps without UE support in // bars (cues for hands on hips)  Heel raises x 2 min  (NP)Standing Hip Abduction x2 min  (NP)Standing Hip Extension x2 min  Shuttle Press BLE with 2 bands x15 reps   Shuttle Press ULE with 1 band x10 reps  "B    NP:  Modified fig-4 piriformis stretch 3x20" each  Gastroc stretch standing in // bars 3x20" each  (NP) Mini squat x 1 min      Pt received Neuromuscular re-education activities to improve: Balance, Coordination, Sense and Proprioception for 10 minutes including:  Normal IRENE on airex foam EO x 30 sec  Normal IRENE on airex foam with EC x 30 sec (mod sway but less overall)  Normal IRENE on foam with head nods and head turns, x 30 sec each  Narrow IRENE on airex foam EO x 30 sec (min sway)  Narrow IRENE on airex foam with UE flexion x 10  (NP)Tandem balance x 30 sec with each foot forward     Home Exercises Provided and Patient Education Provided     Education provided:   - emphasis on keeping track of time with stretches and repetitions with exercises.   - Importance of maintaining HEP    Written Home Exercises Provided: yes. See Patient Instructions for 07/15/2019 in Notes section of EMR.  Exercises were reviewed and Antonia Beltrán was able to demonstrate them prior to the end of the session.  Antonia Beltrán demonstrated good  understanding of the education provided.     Assessment     Pt with overall good tolerance to treatment this date with mild fatigue noted with strengthening exercises. Pt required occasional cues required for TA contraction combined with bridges as well as with standing balance activities. Improving strength and endurance overall however patient continues to require occasional discussion for motivation and current gains so far in therapy. Plan to continue to progress pt next session.    Antonia Beltrán is progressing well towards her goals.   Pt prognosis is Good.     Pt will continue to benefit from skilled outpatient physical therapy to address the deficits listed in the problem list box on initial evaluation, provide pt/family education and to maximize pt's level of independence in the home and community environment.     Pt's spiritual, cultural and educational needs considered and pt agreeable to plan of care " and goals.    Anticipated barriers to physical therapy: none    Goals:   Short Term Goals:  4 weeks  1. Pt will present with increased hip strength on L by one half grade to improve stability with ambulation in home with decreased use of holding walls for stability when walking through home. (progressing, not met)  2. Pt will present with increased ankle strength into DF by one half grade for increased stability with ambulation (progressing, not met)  3. Pt will present with increased Alexandre balance scale score by 4 points for improved safety with ambulation. (progressing, not met)  4. Pt will report increased stability and decreased fear with ambulating in grocery store with decreased reliance on grocery cart. (progressing, not met)     Long Term Goals: 8 weeks  1. Pt will present with increased hip strength on L by one full grade to improve stability with ambulation in home with decreased use of holding walls for stability when walking through home. (progressing, not met)  2. Pt will present with increased ankle strength into DF by one full grade for increased stability with ambulation (progressing, not met)  3. Pt will present with increased Alexandre balance scale score by 8 points for improved safety with ambulation. (progressing, not met)  4. Pt will report increased confidence with walking in the community to grocery shop.  (progressing, not met)  5. Pt will be independent with HEP and self management of symptoms. (progressing, not met)    Plan     Pt to continue strength and balance training progression within current POC toward established PT goals.    Maureen Lambert, PTA, DPT

## 2019-07-23 ENCOUNTER — CLINICAL SUPPORT (OUTPATIENT)
Dept: REHABILITATION | Facility: HOSPITAL | Age: 84
End: 2019-07-23
Payer: MEDICARE

## 2019-07-23 DIAGNOSIS — R29.898 DECREASED STRENGTH OF LOWER EXTREMITY: ICD-10-CM

## 2019-07-23 DIAGNOSIS — R26.89 BALANCE PROBLEM: ICD-10-CM

## 2019-07-23 PROCEDURE — 97110 THERAPEUTIC EXERCISES: CPT | Mod: PN

## 2019-07-23 PROCEDURE — 97112 NEUROMUSCULAR REEDUCATION: CPT | Mod: PN

## 2019-07-23 NOTE — PROGRESS NOTES
Physical Therapy Daily Treatment Note and Reassessment     Name: Antonia Maldonado  Clinic Number: [id number]    Therapy Diagnosis:   Encounter Diagnoses   Name Primary?    Decreased strength of lower extremity     Balance problem      Physician: Ezekiel Wang MD    Visit Date: 2019  Physician Orders: PT Eval and Treat  Medical Diagnosis: Hypertension, Balance Disorder  Evaluation Date: 2019  Authorization Period Expiration: 2019  Plan of Care Certification Period: 2019  Visit #/Visits authorized:      Time In: 4:13 PM (pt arrived late)   Time Out: 5:13 PM  Total Billable Time: 60 minutes  Total Treatment Time: 60 minutes    Precautions: Fall, HTN    Subjective     Pt reports: that she is moving better and is able to perform different tasks in the appropriate amount of time. She is more sure of herself and feels steady on her feet. She is still having some tingling from her knees down which goes into her ankle at times.   She was not compliant with home exercise program.  Response to previous treatment: able to perform activities such as sitting up and down from a chair with less difficulty  Functional change: none    Pain: 3/10 tingling  Location: bilateral legs (knees down to feet only)    Objective     Alexandre Balance Scale    1. SITTING TO STANDIN - able to stand without using hands and stabilize independently   2. STANDING UNSUPPORTED: 4 - able to stand safely for 2 minutes   3. SITTING WITH BACK UNSUPPORTED BUT FEET SUPPORTED ON FLOOR OR ON A STOOL: 4 - able to sit safely and securely for 2 minutes   4. STANDING TO SITTIN - sits safely with minimal use of hands   5. TRANSFERS: 4 - able to transfer safely with minor use of hands   6. STANDING UNSUPPORTED WITH EYES CLOSED: 4 - able to stand 10 seconds safely   7. STANDING UNSUPPORTED WITH FEET TOGETHER: 4 - able to place feet together independently and stand 1 minute safely   8. REACHING FORWARD WITH OUTSTRETCHED ARM WHILE  STANDIN - can reach forward confidently 25 cm (10 inches)   9.  OBJECT FROM THE FLOOR FROM A STANDING POSITION: 4 - able to  slipper safely and easily   10. TURNING TO LOOK BEHIND OVER LEFT AND RIGHT SHOULDERS WHILE STANDIN - looks behind from both sides and weight shifts well   11. TURN 360 DEGREES: 4 - able to turn 360 degrees safely in 4 seconds or less   12. PLACE ALTERNATE FOOT ON STEP OR STOOL WHILE STANDING UNSUPPORTED: 3 - able to stand independently and complete 8 steps in > 20 seconds   13. STANDING UNSUPPORTED ONE FOOT IN FRONT: 2 - able to take small step independently and hold 30 seconds   14. STANDING ON ONE LE - tries to lift leg unable to hold 3 seconds but remains standing independently     TOTAL SCORE: 50/56    Posture:  Forward head    Lower Extremity Strength  Right LE  Left LE    Knee extension: 4+/5 Knee extension: 4-/5   Knee flexion: 4+/5 Knee flexion: 4+/5   Hip flexion: 4/5 Hip flexion: 4/5   Hip extension:  4+/5 Hip extension: 4+/5   Hip abduction: 4+/5 Hip abduction: 4/5   Hip adduction: 4+/5 Hip adduction 4+/5   Ankle dorsiflexion: 4+/5 Ankle dorsiflexion: 4+/5   Ankle plantarflexion: 4+/5 Ankle plantarflexion: 4+/5     CMS Impairment/Limitation/Restriction for FOTO Vertigo Survey  Status Limitation G-Code CMS Severity Modifier  Intake 56% 44%  Predicted 70% 30% Goal Status+ CJ - At least 20 percent but less than 40 percent  2019 58% 42% Current Status CK - At least 40 percent but less than 60 percent    Antonia Beltrán received therapeutic exercises to develop strength, endurance, ROM and flexibility for 50 minutes including: (change exercises to times rather than repetitions for patient's better focus)    Seated lumbar flexion forward 10x10 sec  Seated LAQ x 1 min each leg (small hold at each end range) #1  Supine march 2 x 1 min, alternating 1# cuff weight on ankles  Bridge 2 x 1 min (cues for TA and glut set)  Hip add squeeze supine x 2 min  Clamshells with  "RTB x 2 min   (NP) Seated Hamstring curls with RTB x1 min B  Sit-stand from chair 2x10 reps   Sidesteps x 2 laps without UE support in // bars with YTB (cues for hands on hips)  Heel raises x 2 min  Shuttle Press BLE with 2 bands x15 reps   Shuttle Press ULE with 1 band x10 reps B  Step Ups 6" step 2x10 B      NP:  Modified fig-4 piriformis stretch 3x20" each  Gastroc stretch standing in // bars 3x20" each  (NP) Mini squat x 1 min  (NP)Standing Hip Abduction x2 min  (NP)Standing Hip Extension x2 min      Pt received Neuromuscular re-education activities to improve: Balance, Coordination, Sense and Proprioception for 10 minutes including:    Normal IRENE on airex foam EO x 30 sec  Normal IRENE on airex foam with EC x 30 sec (mod sway but less overall)  Normal IRENE on foam with head nods and head turns, x 30 sec each  Narrow IRENE on airex foam EO x 30 sec (min sway)  Narrow IRENE on airex foam with UE flexion x 10  Tandem balance x 30 sec with each foot forward     Home Exercises Provided and Patient Education Provided     Education provided:   - emphasis on keeping track of time with stretches and repetitions with exercises.   - Importance of maintaining HEP    Written Home Exercises Provided: yes. See Patient Instructions for 07/23/2019 in Notes section of EMR.  Exercises were reviewed and Antonia Beltrán was able to demonstrate them prior to the end of the session.  Antonia Beltrán demonstrated good  understanding of the education provided.     Assessment     Pt with overall good tolerance to treatment with intermittent bouts of fatigue. Consistent VC's are required for reassurance of correct technique. Timed exercises continue to be of more benefit for this patient. VC's required for prevention of knee valgus with shuttle press exercise. Mild sway noted with normal stance balance with eyes closed. Moderate difficulty observed with modified tandem stance balance. Overall, the pt is improving in strength and balance since beginning " therapy. Pt will continue to benefit from skilled outpatient physical therapy to address the deficits listed in the problem list box on initial evaluation, provide pt/family education and to maximize pt's level of independence in the home and community environment.     Antonia Beltrán is progressing well towards her goals.   Pt prognosis is Good.     Pt's spiritual, cultural and educational needs considered and pt agreeable to plan of care and goals.    Anticipated barriers to physical therapy: none    Goals:   Short Term Goals:  4 weeks  1. Pt will present with increased hip strength on L by one half grade to improve stability with ambulation in home with decreased use of holding walls for stability when walking through home. (progressing, not met)  2. Pt will present with increased ankle strength into DF by one half grade for increased stability with ambulation (progressing, not met)  3. Pt will present with increased Alexandre balance scale score by 4 points for improved safety with ambulation. (progressing, not met)  4. Pt will report increased stability and decreased fear with ambulating in grocery store with decreased reliance on grocery cart. (MET: 7/23/19)     Long Term Goals: 8 weeks  1. Pt will present with increased hip strength on L by one full grade to improve stability with ambulation in home with decreased use of holding walls for stability when walking through home. (progressing, not met)  2. Pt will present with increased ankle strength into DF by one full grade for increased stability with ambulation (progressing, not met)  3. Pt will present with increased Alexandre balance scale score by 8 points for improved safety with ambulation. (progressing, not met)  4. Pt will report increased confidence with walking in the community to grocery shop. (MET: 7/23/19)  5. Pt will be independent with HEP and self management of symptoms. (progressing, not met)    Plan     Pt to continue strength and balance training progression  within current POC toward established PT goals.    Clarisa Cunha, PT, DPT

## 2019-07-29 ENCOUNTER — CLINICAL SUPPORT (OUTPATIENT)
Dept: REHABILITATION | Facility: HOSPITAL | Age: 84
End: 2019-07-29
Payer: MEDICARE

## 2019-07-29 DIAGNOSIS — R26.89 BALANCE PROBLEM: ICD-10-CM

## 2019-07-29 DIAGNOSIS — R29.898 DECREASED STRENGTH OF LOWER EXTREMITY: ICD-10-CM

## 2019-07-29 PROCEDURE — 97110 THERAPEUTIC EXERCISES: CPT | Mod: PN

## 2019-07-29 PROCEDURE — 97112 NEUROMUSCULAR REEDUCATION: CPT | Mod: PN

## 2019-07-29 NOTE — PROGRESS NOTES
Physical Therapy Daily Treatment Note and Reassessment     Name: Antonia Maldonado  Clinic Number: [id number]    Therapy Diagnosis:   Encounter Diagnoses   Name Primary?    Decreased strength of lower extremity     Balance problem      Physician: Ezekiel Wang MD    Visit Date: 7/29/2019  Physician Orders: PT Eval and Treat  Medical Diagnosis: Hypertension, Balance Disorder  Evaluation Date: 5/27/2019  Authorization Period Expiration: 12/31/2019  Plan of Care Certification Period: 7/22/2019  Visit #/Visits authorized: 13 / 20     Time In: 3:05 PM (pt arrived late)   Time Out: 4:05 PM  Total Billable Time: 60 minutes  Total Treatment Time: 60 minutes    Precautions: Fall, HTN    Subjective     Pt reports: that the tingling in her legs may be higher than before. Noticed in the last week or so that the tingling appears to now be above her knees. May call her primary care physician to schedule an appointment. States she overall feels like she is moving around easier throughout the day.  She was not compliant with home exercise program.  Response to previous treatment: able to perform activities such as sitting up and down from a chair with less difficulty  Functional change: none    Pain: 4/10 tingling  Location: bilateral legs (knees down to feet only)    Objective       Antonia Beltrán received therapeutic exercises to develop strength, endurance, ROM and flexibility for 50 minutes including: (change exercises to times rather than repetitions for patient's better focus)    Seated lumbar flexion forward 10x10 sec  Seated LAQ x 1 min each leg (small hold at each end range) #1  Seated march  x 1 min, alternating 1# cuff weight on ankles  Bridge 2 x 1 min (cues for TA and glut set)  Hip add squeeze supine x 2 min  Clamshells with RTB x 1 min each - increased fatigue  (NP) Seated Hamstring curls with RTB x1 min B  Sit-stand from chair 2x10 reps   Sidesteps x 2 laps without UE support in // bars with YTB (cues for hands on  "hips)  Heel raises x 2 min  Shuttle Press BLE with 2 bands x15 reps   Shuttle Press ULE with 1 band x10 reps B  (NP)Step Ups 6" step 2x10 B      NP:  Modified fig-4 piriformis stretch 3x20" each  Gastroc stretch standing in // bars 3x20" each  (NP) Mini squat x 1 min  (NP)Standing Hip Abduction x2 min  (NP)Standing Hip Extension x2 min      Pt received Neuromuscular re-education activities to improve: Balance, Coordination, Sense and Proprioception for 10 minutes including:    Normal IRENE on airex foam EO x 30 sec  Normal IRENE on airex foam with EC x 30 sec (mod sway but less overall)  Narrow IRENE on foam with head nods and head turns, x 30 sec each  Narrow IRENE on airex foam EO x 30 sec (min sway)  Narrow IRENE on airex foam with UE flexion x 10  Tandem balance x 30 sec with each foot forward     Home Exercises Provided and Patient Education Provided     Education provided:   - emphasis on keeping track of time with stretches and repetitions with exercises.   - Importance of maintaining HEP    Written Home Exercises Provided: Patient instructed to cont prior HEP.   Exercises were reviewed and Antonia Beltrán was able to demonstrate them prior to the end of the session.  Antonia Beltrán demonstrated good  understanding of the education provided.     Assessment     Pt with mild increase of fatigue noted with resisted exercises this date, possibly due to non-compliance with HEP. Frequent verbal cues required throughout session for proper technique or reassurance of proper technique. Mild/mod sway noted with normal stance balance with eyes closed this date, occasional tactile cues required for reassurance. Pt able to perform tandem balance without LOB however demonstrated increased challenges with narrow IRENE with UE flexion.     Antonia Beltrán is progressing well towards her goals.   Pt prognosis is Good.     Pt's spiritual, cultural and educational needs considered and pt agreeable to plan of care and goals.    Anticipated barriers to physical " therapy: none    Goals:   Short Term Goals:  4 weeks  1. Pt will present with increased hip strength on L by one half grade to improve stability with ambulation in home with decreased use of holding walls for stability when walking through home. (progressing, not met)  2. Pt will present with increased ankle strength into DF by one half grade for increased stability with ambulation (progressing, not met)  3. Pt will present with increased Alexandre balance scale score by 4 points for improved safety with ambulation. (progressing, not met)  4. Pt will report increased stability and decreased fear with ambulating in grocery store with decreased reliance on grocery cart. (MET: 7/23/19)     Long Term Goals: 8 weeks  1. Pt will present with increased hip strength on L by one full grade to improve stability with ambulation in home with decreased use of holding walls for stability when walking through home. (progressing, not met)  2. Pt will present with increased ankle strength into DF by one full grade for increased stability with ambulation (progressing, not met)  3. Pt will present with increased Alexandre balance scale score by 8 points for improved safety with ambulation. (progressing, not met)  4. Pt will report increased confidence with walking in the community to grocery shop. (MET: 7/23/19)  5. Pt will be independent with HEP and self management of symptoms. (progressing, not met)    Plan     Pt to continue strength and balance training progression within current POC toward established PT goals.    Maureen Lambert, PTA, DPT

## 2019-08-01 ENCOUNTER — DOCUMENTATION ONLY (OUTPATIENT)
Dept: REHABILITATION | Facility: HOSPITAL | Age: 84
End: 2019-08-01

## 2019-08-02 NOTE — PROGRESS NOTES
Pt came to appointment today and wanted to talk before we started her session. She is concerned the the tingling in her feet has gotten worse and is now above her knees. I discussed with the patient that at the start of therapy we reviewed that she was coming to therapy for her balance and not for the tingling in her feet. This was discussed with her on multiple occasions with another PT and the PTA who also treated her. She did not recall this, but did agree that her balance is much improved. She is able to stand without having to steady herself prior to walking and does feel more confident in her balance and ability to move without falling.  Pt reports she has not followed up with her PCP in over a year and would like to go back to them to get information on tingling in her feet. She does not want to stay for her appt today but is thankful for the progress she made with her balance.     Updated objective measures not taken today due to taken only two visits ago on 7/23/19 by Clarisa Cunha PT. Please see note for measurements.

## 2019-09-13 ENCOUNTER — OFFICE VISIT (OUTPATIENT)
Dept: URGENT CARE | Facility: CLINIC | Age: 84
End: 2019-09-13
Payer: MEDICARE

## 2019-09-13 VITALS
SYSTOLIC BLOOD PRESSURE: 175 MMHG | HEART RATE: 83 BPM | WEIGHT: 152 LBS | BODY MASS INDEX: 28.7 KG/M2 | TEMPERATURE: 99 F | OXYGEN SATURATION: 95 % | DIASTOLIC BLOOD PRESSURE: 75 MMHG | HEIGHT: 61 IN

## 2019-09-13 DIAGNOSIS — R39.9 UTI SYMPTOMS: Primary | ICD-10-CM

## 2019-09-13 LAB
BILIRUB UR QL STRIP: NEGATIVE
GLUCOSE UR QL STRIP: NEGATIVE
KETONES UR QL STRIP: NEGATIVE
LEUKOCYTE ESTERASE UR QL STRIP: POSITIVE
PH, POC UA: 5 (ref 5–8)
POC BLOOD, URINE: NEGATIVE
POC NITRATES, URINE: NEGATIVE
PROT UR QL STRIP: POSITIVE
SP GR UR STRIP: 1.02 (ref 1–1.03)
UROBILINOGEN UR STRIP-ACNC: NORMAL (ref 0.1–1.1)

## 2019-09-13 PROCEDURE — 81003 URINALYSIS AUTO W/O SCOPE: CPT | Mod: QW,S$GLB,, | Performed by: FAMILY MEDICINE

## 2019-09-13 PROCEDURE — 99213 OFFICE O/P EST LOW 20 MIN: CPT | Mod: 25,S$GLB,, | Performed by: FAMILY MEDICINE

## 2019-09-13 PROCEDURE — 81003 POCT URINALYSIS, DIPSTICK, AUTOMATED, W/O SCOPE: ICD-10-PCS | Mod: QW,S$GLB,, | Performed by: FAMILY MEDICINE

## 2019-09-13 PROCEDURE — 99213 PR OFFICE/OUTPT VISIT, EST, LEVL III, 20-29 MIN: ICD-10-PCS | Mod: 25,S$GLB,, | Performed by: FAMILY MEDICINE

## 2019-09-13 RX ORDER — SULFAMETHOXAZOLE AND TRIMETHOPRIM 800; 160 MG/1; MG/1
1 TABLET ORAL 2 TIMES DAILY
Qty: 14 TABLET | Refills: 0 | Status: SHIPPED | OUTPATIENT
Start: 2019-09-13 | End: 2019-09-20

## 2019-09-13 NOTE — PROGRESS NOTES
"Subjective:       Patient ID: Antonia Maldonado is a 89 y.o. female.    Vitals:  height is 5' 1" (1.549 m) and weight is 68.9 kg (152 lb). Her oral temperature is 98.8 °F (37.1 °C). Her blood pressure is 175/75 (abnormal) and her pulse is 83. Her oxygen saturation is 95%.     Chief Complaint: Urinary Tract Infection    x5 days pt has had urine frequency, and difficulty sleeping. Pt spoke c pharmacist earlier today and concerned for possible UTI. NO OTC Medication today.    Urinary Tract Infection    This is a new problem. The current episode started acute onset. The problem occurs every urination. The problem has been gradually worsening. Associated symptoms include frequency. Pertinent negatives include no chills, hematuria, nausea, urgency, vomiting or rash. She has tried nothing for the symptoms.       Constitution: Negative for chills and fever.   Neck: Negative for painful lymph nodes.   Gastrointestinal: Negative for abdominal pain, nausea and vomiting.   Genitourinary: Positive for frequency. Negative for dysuria, urgency, urine decreased, hematuria, history of kidney stones, painful menstruation, irregular menstruation, missed menses, heavy menstrual bleeding, ovarian cysts, genital trauma, vaginal pain, vaginal discharge, vaginal bleeding, vaginal odor, painful intercourse, genital sore, painful ejaculation and pelvic pain.   Musculoskeletal: Negative for back pain.   Skin: Negative for rash and lesion.   Hematologic/Lymphatic: Negative for swollen lymph nodes.   Psychiatric/Behavioral: Positive for sleep disturbance.       Objective:      Physical Exam   Constitutional: She appears well-developed and well-nourished.   HENT:   Head: Normocephalic and atraumatic.   Right Ear: External ear normal.   Left Ear: External ear normal.   Nose: Nose normal.   Mouth/Throat: Oropharynx is clear and moist. No oropharyngeal exudate.   Eyes: Conjunctivae are normal. Right eye exhibits no discharge. Left eye exhibits no " discharge.   Cardiovascular: Normal rate, regular rhythm and normal heart sounds.   Pulmonary/Chest: Effort normal and breath sounds normal. She has no wheezes.   Skin: Skin is warm and dry.   Psychiatric: She has a normal mood and affect. Her behavior is normal.   Vitals reviewed.      Assessment:       1. UTI symptoms        Plan:         UTI symptoms  -     POCT Urinalysis, Dipstick, Automated, W/O Scope  -     Culture, Urine    Other orders  -     sulfamethoxazole-trimethoprim 800-160mg (BACTRIM DS) 800-160 mg Tab; Take 1 tablet by mouth 2 (two) times daily. for 7 days  Dispense: 14 tablet; Refill: 0    pt has not taked bp meds today. Instructed to do so asap  Pt daughter present and vu

## 2019-09-13 NOTE — PATIENT INSTRUCTIONS
"Please return here or go to the Emergency Department for any concerns or worsening of condition.  If you were prescribed antibiotics, please take them to completion.  If you were prescribed a narcotic medication, do not drive or operate heavy equipment or machinery while taking these medications.  Please follow up with your primary care doctor or specialist as needed.  If you  smoke, please stop smoking.  UTI  A bladder infection ("cystitis" or "UTI") usually causes a constant urge to urinate and a burning when passing urine. Urine may be cloudy, smelly or dark. There may be pain in the lower abdomen. A bladder infection occurs when bacteria from the vaginal area enter the bladder opening (urethra). This can occur from sexual intercourse, wearing tight clothing, dehydration and other factors.    Cystitis in males is not common. It may be caused by a partial blockage in the urinary system that keeps the bladder from emptying completely. This is most often related to an enlarged prostate gland.      HOME CARE:  1. Drink lots of fluids (at least 6-8 glasses a day, unless you must restrict fluids for other medical reasons). This will force the medicine into your urinary system and flush the bacteria out of your body.  2. Avoid sexual intercourse until your symptoms are gone.  3. Avoid caffeine, alcohol and spicy foods. These can irritate the bladder.  4. A bladder infection is treated with antibiotics. You may also be given Pyridium (generic = phenazopyridine) to reduce the burning sensation. This medicine will cause your urine to become a bright orange color. The orange urine may stain clothing. You may wear a pad or panty-liner to protect clothing.    PREVENTING FUTURE INFECTIONS:  1. Always wipe from front to back after a bowel movement.  2. Keep the genital area clean and dry.  3. Drink plenty of fluids each day to avoid dehydration.  4. Both sexual partners should wash before intercourse.  5. Urinate right after " intercourse to flush out the bladder.  6. Wear cotton underwear and cotton-lined panty hose; avoid tight-fitting pants.  7. If you are on birth control pills and are having frequent bladder infections, discuss with your doctor.    FOLLOW UP: Return to this facility or see your doctor if ALL symptoms are not gone after three days of treatment.    GET PROMPT MEDICAL ATTENTION if any of the following occur:  Fever over 100.0ºF (37.8ºC)  No improvement by the third day of treatment  Increasing back or abdominal pain  Repeated vomiting; unable to keep medicine down  Weakness, dizziness or fainting  Vaginal discharge  Pain, redness or swelling in the labia (outer vaginal area)    Proceed to ER if symptoms worsen or do not improve  Please return here or go to the Emergency Department for any concerns or worsening of condition.  If you were prescribed antibiotics, please take them to completion.  If you were prescribed a narcotic medication, do not drive or operate heavy equipment or machinery while taking these medications.  Please follow up with your primary care doctor or specialist as needed.  If you  smoke, please stop smoking.

## 2019-09-20 ENCOUNTER — TELEPHONE (OUTPATIENT)
Dept: URGENT CARE | Facility: CLINIC | Age: 84
End: 2019-09-20

## 2019-09-20 LAB
BACTERIA UR CULT: ABNORMAL
BACTERIA UR CULT: ABNORMAL
OTHER ANTIBIOTIC SUSC ISLT: ABNORMAL

## 2019-09-25 ENCOUNTER — OFFICE VISIT (OUTPATIENT)
Dept: URGENT CARE | Facility: CLINIC | Age: 84
End: 2019-09-25
Payer: MEDICARE

## 2019-09-25 VITALS
DIASTOLIC BLOOD PRESSURE: 87 MMHG | WEIGHT: 152 LBS | HEIGHT: 61 IN | HEART RATE: 83 BPM | OXYGEN SATURATION: 97 % | TEMPERATURE: 98 F | SYSTOLIC BLOOD PRESSURE: 183 MMHG | BODY MASS INDEX: 28.7 KG/M2 | RESPIRATION RATE: 16 BRPM

## 2019-09-25 DIAGNOSIS — N39.0 URINARY TRACT INFECTION WITHOUT HEMATURIA, SITE UNSPECIFIED: Primary | ICD-10-CM

## 2019-09-25 DIAGNOSIS — R30.9 PAINFUL URINATION: ICD-10-CM

## 2019-09-25 LAB
BILIRUB UR QL STRIP: NEGATIVE
GLUCOSE UR QL STRIP: NEGATIVE
KETONES UR QL STRIP: NEGATIVE
LEUKOCYTE ESTERASE UR QL STRIP: POSITIVE
PH, POC UA: 5 (ref 5–8)
POC BLOOD, URINE: POSITIVE
POC NITRATES, URINE: NEGATIVE
PROT UR QL STRIP: POSITIVE
SP GR UR STRIP: 1.02 (ref 1–1.03)
UROBILINOGEN UR STRIP-ACNC: NORMAL (ref 0.1–1.1)

## 2019-09-25 PROCEDURE — 99214 PR OFFICE/OUTPT VISIT, EST, LEVL IV, 30-39 MIN: ICD-10-PCS | Mod: S$GLB,,, | Performed by: INTERNAL MEDICINE

## 2019-09-25 PROCEDURE — 99214 OFFICE O/P EST MOD 30 MIN: CPT | Mod: S$GLB,,, | Performed by: INTERNAL MEDICINE

## 2019-09-25 PROCEDURE — 81003 POCT URINALYSIS, DIPSTICK, AUTOMATED, W/O SCOPE: ICD-10-PCS | Mod: QW,S$GLB,, | Performed by: INTERNAL MEDICINE

## 2019-09-25 PROCEDURE — 81003 URINALYSIS AUTO W/O SCOPE: CPT | Mod: QW,S$GLB,, | Performed by: INTERNAL MEDICINE

## 2019-09-25 RX ORDER — NITROFURANTOIN 25; 75 MG/1; MG/1
100 CAPSULE ORAL 2 TIMES DAILY
Qty: 14 CAPSULE | Refills: 0 | Status: SHIPPED | OUTPATIENT
Start: 2019-09-25 | End: 2019-10-02

## 2019-09-25 NOTE — PATIENT INSTRUCTIONS

## 2019-09-25 NOTE — PROGRESS NOTES
"Subjective:       Patient ID: Antonia Maldonado is a 89 y.o. female.    Vitals:  height is 5' 1" (1.549 m) and weight is 68.9 kg (152 lb). Her oral temperature is 97.9 °F (36.6 °C). Her blood pressure is 183/87 (abnormal) and her pulse is 83. Her respiration is 16 and oxygen saturation is 97%.     Chief Complaint: Urinary Tract Infection    Patient states that she is feeling that she is feeling that she doesn't feel that she healed fully from the previous uti.  Her daughter states that she has trouble certain antibiotics    Urinary Tract Infection    This is a recurrent problem. The current episode started 1 to 4 weeks ago. The problem occurs every urination. The problem has been gradually worsening. The quality of the pain is described as burning. The pain is at a severity of 5/10. The pain is mild. There has been no fever. She is not sexually active. There is no history of pyelonephritis. Associated symptoms include flank pain, frequency and urgency. Pertinent negatives include no chills, hematuria, nausea, vomiting or rash. She has tried antibiotics for the symptoms. The treatment provided no relief.       Constitution: Negative for chills and fever.   Neck: Negative for painful lymph nodes.   Gastrointestinal: Negative for abdominal pain, nausea and vomiting.   Genitourinary: Positive for frequency, urgency, urine decreased (burning ) and flank pain. Negative for dysuria, hematuria, history of kidney stones, painful menstruation, irregular menstruation, missed menses, heavy menstrual bleeding, ovarian cysts, genital trauma, vaginal pain, vaginal discharge, vaginal bleeding, vaginal odor, painful intercourse, genital sore, painful ejaculation and pelvic pain.   Musculoskeletal: Negative for back pain.   Skin: Negative for rash and lesion.   Hematologic/Lymphatic: Negative for swollen lymph nodes.       Objective:      Physical Exam   Constitutional: She is oriented to person, place, and time. She appears well-developed " and well-nourished. She is cooperative.  Non-toxic appearance. She does not appear ill. No distress.   HENT:   Head: Normocephalic and atraumatic.   Right Ear: Hearing, tympanic membrane, external ear and ear canal normal.   Left Ear: Hearing, tympanic membrane, external ear and ear canal normal.   Nose: Nose normal. No mucosal edema, rhinorrhea or nasal deformity. No epistaxis. Right sinus exhibits no maxillary sinus tenderness and no frontal sinus tenderness. Left sinus exhibits no maxillary sinus tenderness and no frontal sinus tenderness.   Mouth/Throat: Uvula is midline, oropharynx is clear and moist and mucous membranes are normal. No trismus in the jaw. Normal dentition. No uvula swelling. No posterior oropharyngeal erythema.   Eyes: Conjunctivae and lids are normal. Right eye exhibits no discharge. Left eye exhibits no discharge. No scleral icterus.   Sclera clear bilat   Neck: Trachea normal, normal range of motion, full passive range of motion without pain and phonation normal. Neck supple.   Cardiovascular: Normal rate, regular rhythm, normal heart sounds, intact distal pulses and normal pulses.   Pulmonary/Chest: Effort normal and breath sounds normal. No respiratory distress.   Abdominal: Soft. Normal appearance and bowel sounds are normal. She exhibits no distension, no pulsatile midline mass and no mass. There is no tenderness.   Musculoskeletal: Normal range of motion. She exhibits no edema or deformity.   Neurological: She is alert and oriented to person, place, and time. She exhibits normal muscle tone. Coordination normal.   Skin: Skin is warm, dry and intact. She is not diaphoretic. No pallor.   Psychiatric: She has a normal mood and affect. Her speech is normal and behavior is normal. Judgment and thought content normal. Cognition and memory are normal.   Nursing note and vitals reviewed.      Assessment:       1. Painful urination    2. Urinary tract infection without hematuria, site  "unspecified        Plan:         Painful urination  -     POCT Urinalysis, Dipstick, Automated, W/O Scope    Urinary tract infection without hematuria, site unspecified      Patient Instructions     Bladder Infection, Female (Adult)    Urine is normally doesn't have any bacteria in it. But bacteria can get into the urinary tract from the skin around the rectum. Or they can travel in the blood from elsewhere in the body. Once they are in your urinary tract, they can cause infection in the urethra (urethritis), the bladder (cystitis), or the kidneys (pyelonephritis).  The most common place for an infection is in the bladder. This is called a bladder infection. This is one of the most common infections in women. Most bladder infections are easily treated. They are not serious unless the infection spreads to the kidney.  The phrases "bladder infection," "UTI," and "cystitis" are often used to describe the same thing. But they are not always the same. Cystitis is an inflammation of the bladder. The most common cause of cystitis is an infection.  Symptoms  The infection causes inflammation in the urethra and bladder. This causes many of the symptoms. The most common symptoms of a bladder infection are:  · Pain or burning when urinating  · Having to urinate more often than usual  · Urgent need to urinate  · Only a small amount of urine comes out  · Blood in urine  · Abdominal discomfort. This is usually in the lower abdomen above the pubic bone.  · Cloudy urine  · Strong- or bad-smelling urine  · Unable to urinate (urinary retention)  · Unable to hold urine in (urinary incontinence)  · Fever  · Loss of appetite  · Confusion (in older adults)  Causes  Bladder infections are not contagious. You can't get one from someone else, from a toilet seat, or from sharing a bath.  The most common cause of bladder infections is bacteria from the bowels. The bacteria get onto the skin around the opening of the urethra. From there, they " can get into the urine and travel up to the bladder, causing inflammation and infection. This usually happens because of:  · Wiping improperly after urinating. Always wipe from front to back.  · Bowel incontinence  · Pregnancy  · Procedures such as having a catheter inserted  · Older age  · Not emptying your bladder. This can allow bacteria a chance to grow in your urine.  · Dehydration  · Constipation  · Sex  · Use of a diaphragm for birth control   Treatment  Bladder infections are diagnosed by a urine test. They are treated with antibiotics and usually clear up quickly without complications. Treatment helps prevent a more serious kidney infection.  Medicines  Medicines can help in the treatment of a bladder infection:  · Take antibiotics until they are used up, even if you feel better. It is important to finish them to make sure the infection has cleared.  · You can use acetaminophen or ibuprofen for pain, fever, or discomfort, unless another medicine was prescribed. If you have chronic liver or kidney disease, talk with your healthcare provider before using these medicines. Also talk with your provider if you've ever had a stomach ulcer or gastrointestinal bleeding, or are taking blood-thinner medicines.  · If you are given phenazopydridine to reduce burning with urination, it will cause your urine to become a bright orange color. This can stain clothing.  Care and prevention  These self-care steps can help prevent future infections:  · Drink plenty of fluids to prevent dehydration and flush out your bladder. Do this unless you must restrict fluids for other health reasons, or your doctor told you not to.  · Proper cleaning after going to the bathroom is important. Wipe from front to back after using the toilet to prevent the spread of bacteria.  · Urinate more often. Don't try to hold urine in for a long time.  · Wear loose-fitting clothes and cotton underwear. Avoid tight-fitting pants.  · Improve your diet  and prevent constipation. Eat more fresh fruit and vegetables, and fiber, and less junk and fatty foods.  · Avoid sex until your symptoms are gone.  · Avoid caffeine, alcohol, and spicy foods. These can irritate your bladder.  · Urinate right after intercourse to flush out your bladder.  · If you use birth control pills and have frequent bladder infections, discuss it with your doctor.  Follow-up care  Call your healthcare provider if all symptoms are not gone after 3 days of treatment. This is especially important if you have repeat infections.  If a culture was done, you will be told if your treatment needs to be changed. If directed, you can call to find out the results.  If X-rays were done, you will be told if the results will affect your treatment.  Call 911  Call 911 if any of the following occur:  · Trouble breathing  · Hard to wake up or confusion  · Fainting or loss of consciousness  · Rapid heart rate  When to seek medical advice  Call your healthcare provider right away if any of these occur:  · Fever of 100.4ºF (38.0ºC) or higher, or as directed by your healthcare provider  · Symptoms are not better by the third day of treatment  · Back or belly (abdominal) pain that gets worse  · Repeated vomiting, or unable to keep medicine down  · Weakness or dizziness  · Vaginal discharge  · Pain, redness, or swelling in the outer vaginal area (labia)  Date Last Reviewed: 10/1/2016  © 9352-8651 [employer]. [address] [place] [address], [place] [address]. All rights reserved. This information is not intended as a substitute for professional medical care. Always follow your healthcare professional's instructions.           My notes were dictated with [employer]. Any misspellings or nonsensical grammar should be attributed to its use and allowances made for errors and typographic syntactical error(s).

## 2019-09-28 ENCOUNTER — TELEPHONE (OUTPATIENT)
Dept: URGENT CARE | Facility: CLINIC | Age: 84
End: 2019-09-28

## 2019-09-28 NOTE — TELEPHONE ENCOUNTER
Spoke with Domenica.  Domenica will check with patient to see if she is feeling better on the new antibiotics.  She will also try and get patient in with a follow up appt to keep an eye on her conditions.

## 2019-09-30 ENCOUNTER — TELEPHONE (OUTPATIENT)
Dept: UROGYNECOLOGY | Facility: CLINIC | Age: 84
End: 2019-09-30

## 2019-09-30 NOTE — TELEPHONE ENCOUNTER
----- Message from Franky Ma sent at 9/30/2019 10:44 AM CDT -----  Contact: pt's daughter Domenica   Type:  Sooner Apoointment Request    Caller is requesting a sooner appointment.  Caller declined first available appointment listed below.  Caller will not accept being placed on the waitlist and is requesting a message be sent to doctor.    Name of Caller:  Domenica  When is the first available appointment?  Epic kept pushing it out without giving a date  Symptoms:  UTI  Best Call Back Number:  [phone]  Additional Information:  Pt saw Dr. Neely about 10 years ago. Pt would like to see Dr. Neely for her UTI. States the UTI does not go away. Pt has been diagnosed more than 3 times this year with a UTI.

## 2019-10-02 ENCOUNTER — PROCEDURE VISIT (OUTPATIENT)
Dept: UROGYNECOLOGY | Facility: CLINIC | Age: 84
End: 2019-10-02
Payer: MEDICARE

## 2019-10-02 VITALS
HEIGHT: 62 IN | WEIGHT: 147.5 LBS | DIASTOLIC BLOOD PRESSURE: 86 MMHG | BODY MASS INDEX: 27.14 KG/M2 | HEART RATE: 82 BPM | SYSTOLIC BLOOD PRESSURE: 152 MMHG

## 2019-10-02 DIAGNOSIS — R30.0 DYSURIA: ICD-10-CM

## 2019-10-02 DIAGNOSIS — R35.0 URINARY FREQUENCY: ICD-10-CM

## 2019-10-02 DIAGNOSIS — K64.9 HEMORRHOIDS, UNSPECIFIED HEMORRHOID TYPE: ICD-10-CM

## 2019-10-02 DIAGNOSIS — N39.0 URINARY TRACT INFECTION WITHOUT HEMATURIA, SITE UNSPECIFIED: Primary | ICD-10-CM

## 2019-10-02 LAB
BILIRUB SERPL-MCNC: ABNORMAL MG/DL
BLOOD URINE, POC: 250
COLOR, POC UA: YELLOW
GLUCOSE UR QL STRIP: ABNORMAL
KETONES UR QL STRIP: ABNORMAL
LEUKOCYTE ESTERASE URINE, POC: ABNORMAL
NITRITE, POC UA: ABNORMAL
PH, POC UA: 5
PROTEIN, POC: ABNORMAL
SPECIFIC GRAVITY, POC UA: 1020
UROBILINOGEN, POC UA: ABNORMAL

## 2019-10-02 PROCEDURE — 51700 IRRIGATION OF BLADDER: CPT | Mod: S$PBB,,, | Performed by: NURSE PRACTITIONER

## 2019-10-02 PROCEDURE — 99214 PR OFFICE/OUTPT VISIT, EST, LEVL IV, 30-39 MIN: ICD-10-PCS | Mod: S$PBB,25,, | Performed by: NURSE PRACTITIONER

## 2019-10-02 PROCEDURE — 51700 PR IRRIGATION, BLADDER: ICD-10-PCS | Mod: S$PBB,,, | Performed by: NURSE PRACTITIONER

## 2019-10-02 PROCEDURE — 87086 URINE CULTURE/COLONY COUNT: CPT

## 2019-10-02 PROCEDURE — 81002 URINALYSIS NONAUTO W/O SCOPE: CPT | Mod: PBBFAC,PO | Performed by: NURSE PRACTITIONER

## 2019-10-02 PROCEDURE — 99214 OFFICE O/P EST MOD 30 MIN: CPT | Mod: S$PBB,25,, | Performed by: NURSE PRACTITIONER

## 2019-10-02 PROCEDURE — 51700 IRRIGATION OF BLADDER: CPT | Mod: PBBFAC,PO | Performed by: NURSE PRACTITIONER

## 2019-10-02 RX ORDER — CEPHALEXIN 500 MG/1
500 CAPSULE ORAL EVERY 8 HOURS
Qty: 21 CAPSULE | Refills: 0 | Status: SHIPPED | OUTPATIENT
Start: 2019-10-02 | End: 2019-10-09

## 2019-10-02 RX ORDER — GENTAMICIN SULFATE 40 MG/ML
80 INJECTION, SOLUTION INTRAMUSCULAR; INTRAVENOUS ONCE
Status: COMPLETED | OUTPATIENT
Start: 2019-10-02 | End: 2019-10-02

## 2019-10-02 RX ORDER — HYDROCORTISONE ACETATE 25 MG/1
SUPPOSITORY RECTAL
Qty: 24 SUPPOSITORY | Refills: 2 | Status: SHIPPED | OUTPATIENT
Start: 2019-10-02 | End: 2019-10-15

## 2019-10-02 RX ORDER — LIDOCAINE HYDROCHLORIDE 20 MG/ML
20 INJECTION, SOLUTION INFILTRATION; PERINEURAL
Status: COMPLETED | OUTPATIENT
Start: 2019-10-02 | End: 2019-10-02

## 2019-10-02 RX ADMIN — GENTAMICIN SULFATE 80 MG: 40 INJECTION, SOLUTION INTRAMUSCULAR; INTRAVENOUS at 04:10

## 2019-10-02 RX ADMIN — LIDOCAINE HYDROCHLORIDE 20 ML: 20 INJECTION, SOLUTION INFILTRATION; PERINEURAL at 04:10

## 2019-10-02 NOTE — PROCEDURES
Subjective:       Patient ID: Antonia Maldonado is a 89 y.o. female.    Chief Complaint: Urinary Tract Infection      Antonia Maldonado is a 89 y.o. female who presents today for UTI that she does not feel will clear.  She started having difficulty sleeping at night about 3 weeks ago.  She went to urgent care and was told she had a UTI and was placed on bactrim.  The urine culture was positive for E. Coli that was pan sensitive.  She went back to urgent care about a week later as she was having some dysuria and lower abd tenderness.  She was told she had a UTI and was given macrodantin.  A repeat urine culture was not performed at that time.  As she sits here today, she still feels that she has a UTI.  She has increased frequency and dysuria.  She has some lower abd tenderness.  She has some left CVA tenderness at times.  She doesn't normally have problems with her urinary frequency and normally has nocturia x 1.  She denies any incontinence, but wears a pad for piece of mind.  She denies any history of recurrent UTI or stones.  She drinks mostly water and milk but doesn't always drink as much as she should.  She denies any vaginal complaints/concerns.  She is .  She has had a hysterectomy in the past for fibroids.  She is no longer sexually active.  She is very anxious for something to be done.    Review of Systems   Constitutional: Negative for activity change, fever and unexpected weight change.   HENT: Negative for hearing loss.    Eyes: Negative for visual disturbance.   Respiratory: Negative for shortness of breath and wheezing.    Cardiovascular: Negative for chest pain, palpitations and leg swelling.   Gastrointestinal: Positive for abdominal pain. Negative for constipation and diarrhea.   Genitourinary: Positive for dysuria and frequency. Negative for dyspareunia, urgency, vaginal bleeding and vaginal discharge.   Musculoskeletal: Negative for gait problem and neck pain.   Skin: Negative for rash and wound.    Allergic/Immunologic: Negative for immunocompromised state.   Neurological: Negative for tremors, speech difficulty and weakness.   Hematological: Does not bruise/bleed easily.   Psychiatric/Behavioral: Negative for agitation and confusion.       Objective:      Physical Exam   Constitutional: She is oriented to person, place, and time. She appears well-developed and well-nourished. No distress.   HENT:   Head: Normocephalic and atraumatic.   Neck: Neck supple. No thyromegaly present.   Cardiovascular:   No swelling in BLE   Pulmonary/Chest: Effort normal. No respiratory distress.   Abdominal: Soft. There is tenderness in the suprapubic area. There is CVA tenderness. No hernia.   Mild left CVA tenderness   Musculoskeletal: Normal range of motion.   Neurological: She is alert and oriented to person, place, and time.   Skin: Skin is warm and dry. No rash noted.   Psychiatric: She has a normal mood and affect. Her behavior is normal.     Pelvic Exam:  V: No lesions. No palpable nodes.   Va: no discharge or bleeding.  Good length and support tissue is atrophic.  Meatus:No caruncle or stenosis  Urethra: Non tender. No suburethral masses.  Cx/Cuff: Normal   Uterus: surgically absent  Ad: No mass or tenderness.  Levators :Symmetrical. Normal tone. Non tender.  BL: mildly tender  RV:  hemorrhoids.      Assessment:       1. Urinary tract infection without hematuria, site unspecified    2. Urinary frequency    3. Dysuria    4. Hemorrhoids, unspecified hemorrhoid type        Procedure note- After betadine irrigation of the urethra, lidocaine instilled via urojet.   A #14 [place] red rubber tip catheter was inserted into the bladder.  80 mls of residual urine noted. 80 mg of gentamicin and 20 mls of 2% lidocaine instilled into bladder.  Pt instructed to hold mixture for at least 1 hour prior to voiding.  Pt verbalized understanding.      Plan:       Urinary tract infection without hematuria, site unspecified- we will send a  catheterized urine specimen as noted below  -     POCT URINE DIPSTICK WITHOUT MICROSCOPE  -     Urine culture  -     lidocaine HCL 20 mg/ml (2%) injection 20 mL  -     gentamicin injection 80 mg  -     cephALEXin (KEFLEX) 500 MG capsule; Take 1 capsule (500 mg total) by mouth every 8 (eight) hours. for 7 days  Dispense: 21 capsule; Refill: 0    Urinary frequency- monitor    Dysuria- monitor    Hemorrhoids, unspecified hemorrhoid type- as noted below  -     hydrocortisone (ANUCORT-HC) 25 mg suppository; PLACE 1 SUPPOSITORY RECTALLY TWICE DAILY  Dispense: 24 suppository; Refill: 2    RTC PRN

## 2019-10-04 ENCOUNTER — TELEPHONE (OUTPATIENT)
Dept: UROGYNECOLOGY | Facility: CLINIC | Age: 84
End: 2019-10-04

## 2019-10-04 LAB — BACTERIA UR CULT: NO GROWTH

## 2019-10-04 NOTE — TELEPHONE ENCOUNTER
Called and left message that the urine culture came back normal  With no growth, so she can follow up with us  As needed  . And was going to let her know that the suppositories require a PA

## 2019-10-07 ENCOUNTER — TELEPHONE (OUTPATIENT)
Dept: UROGYNECOLOGY | Facility: CLINIC | Age: 84
End: 2019-10-07

## 2019-10-07 NOTE — TELEPHONE ENCOUNTER
----- Message from Clarita Briseno LPN sent at 10/4/2019  4:17 PM CDT -----  Called and left message that the urine culture came back normal  With no growth, so she can follow up with us  As needed  . And was going to let her know that the suppositories require a PA

## 2019-10-08 ENCOUNTER — OFFICE VISIT (OUTPATIENT)
Dept: UROGYNECOLOGY | Facility: CLINIC | Age: 84
End: 2019-10-08
Payer: MEDICARE

## 2019-10-08 VITALS
WEIGHT: 146.63 LBS | HEART RATE: 77 BPM | BODY MASS INDEX: 26.98 KG/M2 | SYSTOLIC BLOOD PRESSURE: 167 MMHG | DIASTOLIC BLOOD PRESSURE: 79 MMHG | HEIGHT: 62 IN

## 2019-10-08 DIAGNOSIS — R30.0 DYSURIA: ICD-10-CM

## 2019-10-08 DIAGNOSIS — K64.9 HEMORRHOIDS, UNSPECIFIED HEMORRHOID TYPE: ICD-10-CM

## 2019-10-08 DIAGNOSIS — N30.90 CYSTITIS: ICD-10-CM

## 2019-10-08 DIAGNOSIS — R35.0 URINARY FREQUENCY: Primary | ICD-10-CM

## 2019-10-08 LAB
BILIRUB SERPL-MCNC: ABNORMAL MG/DL
BLOOD URINE, POC: ABNORMAL
COLOR, POC UA: YELLOW
GLUCOSE UR QL STRIP: ABNORMAL
KETONES UR QL STRIP: ABNORMAL
LEUKOCYTE ESTERASE URINE, POC: ABNORMAL
NITRITE, POC UA: ABNORMAL
PH, POC UA: 5
PROTEIN, POC: ABNORMAL
SPECIFIC GRAVITY, POC UA: 1015
UROBILINOGEN, POC UA: ABNORMAL

## 2019-10-08 PROCEDURE — 99213 OFFICE O/P EST LOW 20 MIN: CPT | Mod: PBBFAC,PO | Performed by: NURSE PRACTITIONER

## 2019-10-08 PROCEDURE — 99999 PR PBB SHADOW E&M-EST. PATIENT-LVL III: CPT | Mod: PBBFAC,,, | Performed by: NURSE PRACTITIONER

## 2019-10-08 PROCEDURE — 51700 IRRIGATION OF BLADDER: CPT | Mod: S$PBB,,, | Performed by: NURSE PRACTITIONER

## 2019-10-08 PROCEDURE — 99214 PR OFFICE/OUTPT VISIT, EST, LEVL IV, 30-39 MIN: ICD-10-PCS | Mod: 25,S$PBB,, | Performed by: NURSE PRACTITIONER

## 2019-10-08 PROCEDURE — 81002 URINALYSIS NONAUTO W/O SCOPE: CPT | Mod: PBBFAC,PO | Performed by: NURSE PRACTITIONER

## 2019-10-08 PROCEDURE — 51700 IRRIGATION OF BLADDER: CPT | Mod: PBBFAC,PO | Performed by: NURSE PRACTITIONER

## 2019-10-08 PROCEDURE — 99999 PR PBB SHADOW E&M-EST. PATIENT-LVL III: ICD-10-PCS | Mod: PBBFAC,,, | Performed by: NURSE PRACTITIONER

## 2019-10-08 PROCEDURE — 99214 OFFICE O/P EST MOD 30 MIN: CPT | Mod: 25,S$PBB,, | Performed by: NURSE PRACTITIONER

## 2019-10-08 PROCEDURE — 51700 PR IRRIGATION, BLADDER: ICD-10-PCS | Mod: S$PBB,,, | Performed by: NURSE PRACTITIONER

## 2019-10-08 RX ORDER — GENTAMICIN SULFATE 40 MG/ML
80 INJECTION, SOLUTION INTRAMUSCULAR; INTRAVENOUS ONCE
Status: COMPLETED | OUTPATIENT
Start: 2019-10-08 | End: 2019-10-08

## 2019-10-08 RX ORDER — LIDOCAINE HYDROCHLORIDE 20 MG/ML
20 INJECTION, SOLUTION INFILTRATION; PERINEURAL
Status: COMPLETED | OUTPATIENT
Start: 2019-10-08 | End: 2019-10-08

## 2019-10-08 RX ADMIN — GENTAMICIN SULFATE 80 MG: 40 INJECTION, SOLUTION INTRAMUSCULAR; INTRAVENOUS at 04:10

## 2019-10-08 RX ADMIN — LIDOCAINE HYDROCHLORIDE 20 ML: 20 INJECTION, SOLUTION INFILTRATION; PERINEURAL at 04:10

## 2019-10-08 NOTE — PROGRESS NOTES
Subjective:       Patient ID: Antonia Maldonado is a 89 y.o. female.    Chief Complaint: not any better      Antonia Maldonado is a 89 y.o. female who presents today for follow up in regards to her dysuria and what she feels is a UTI that won't clear.  She is still having problems with nocturia x 2-3.  She has dysuria.  She can't remember if she felt any better after the instillation last week.  Her urine culture from last week showed no growth.  She stopped her keflex yesterday as she didn't feel it was helping.  She took the AZO, but then stopped it as it was giving her a stomach ache.  She is very anxious for this to resolve.      Review of Systems   Constitutional: Negative for activity change, fever and unexpected weight change.   HENT: Negative for hearing loss.    Eyes: Negative for visual disturbance.   Respiratory: Negative for shortness of breath and wheezing.    Cardiovascular: Negative for chest pain, palpitations and leg swelling.   Gastrointestinal: Negative for abdominal pain, constipation and diarrhea.   Genitourinary: Positive for dysuria, frequency and urgency. Negative for dyspareunia, vaginal bleeding and vaginal discharge.   Musculoskeletal: Negative for gait problem and neck pain.   Skin: Negative for rash and wound.   Allergic/Immunologic: Negative for immunocompromised state.   Neurological: Negative for tremors, speech difficulty and weakness.   Hematological: Does not bruise/bleed easily.   Psychiatric/Behavioral: Negative for agitation and confusion.       Objective:      Physical Exam   Constitutional: She is oriented to person, place, and time. She appears well-developed and well-nourished. No distress.   HENT:   Head: Normocephalic and atraumatic.   Neck: Neck supple. No thyromegaly present.   Pulmonary/Chest: Effort normal. No respiratory distress.   Abdominal: Soft. There is tenderness in the suprapubic area. No hernia.   Musculoskeletal: Normal range of motion.   Neurological: She is alert and  oriented to person, place, and time.   Skin: Skin is warm and dry. No rash noted.   Psychiatric: She has a normal mood and affect. Her behavior is normal.     Pelvic Exam:  V: No lesions. No palpable nodes.   Meatus:No caruncle or stenosis  Urethra: Non tender. No suburethral masses.  BL:  tender  RV: No hemorrhoids.      Assessment:       1. Urinary frequency    2. Cystitis    3. Dysuria    4. Hemorrhoids, unspecified hemorrhoid type        Procedure note- After betadine irrigation of the urethra, lidocaine instilled via urojet.   A #14 [place] red rubber tip catheter was inserted into the bladder.  70 mls of residual urine noted.  80 mg of gentamicin, 2 capsules of [place], which the pt provided, and 20 mls of 2% lidocaine instilled into bladder.  Pt instructed to hold mixture for at least 1 hour prior to voiding.  Pt verbalized understanding.      Plan:       Urinary frequency- monitor  -     POCT URINE DIPSTICK WITHOUT MICROSCOPE    Cystitis - instillation as noted above.  Pt will also try URIMAR- T to see if this gives any relief.  -     lidocaine HCL 20 mg/ml (2%) injection 20 mL  -     gentamicin injection 80 mg    Dysuria- as noted above    Hemorrhoids, unspecified hemorrhoid type- anucort that was sent in for the pt is not covered and no PA can be started.  NP did make pt aware of this.    RTC PRN

## 2019-10-10 ENCOUNTER — TELEPHONE (OUTPATIENT)
Dept: UROGYNECOLOGY | Facility: CLINIC | Age: 84
End: 2019-10-10

## 2019-10-10 NOTE — TELEPHONE ENCOUNTER
"Her sleeping may take a little while to get back to "normal"  Does she still feel that she is having as much urgency and "pressure" burning to go to the restroom?  If so I would like to try a repeat instillation next week if possible.  "

## 2019-10-10 NOTE — TELEPHONE ENCOUNTER
I have sent the urimar in to the pharmacy.  If it is too expensive, please call us and let us know.

## 2019-10-10 NOTE — TELEPHONE ENCOUNTER
Spoke to pt and she feels so much better has been using the URMAR-T and is almost out can you send in to [facility]?

## 2019-10-10 NOTE — TELEPHONE ENCOUNTER
----- Message from Francisca Dalton sent at 10/10/2019  4:30 PM CDT -----  Contact: self   Patient requesting to nurse regarding a recent script that she was on called urmart per patient       Patient will like refill but asking to speak to nurse first       Please call to advice [phone] (home)

## 2019-10-10 NOTE — TELEPHONE ENCOUNTER
Spoke to daughter and she states that her sleeping is still off , maybe a slight difference ,but nothing noticeable yet.

## 2019-10-14 NOTE — TELEPHONE ENCOUNTER
insurance does not cover this medication urimar T, daughter was wanting to know what are the next steps. Mom is not feeling any better, daughter states there was mentioning of a procedure and wanted to know how much longer does she need to continue the instillations or get the procedure. Please advise?

## 2019-10-15 NOTE — TELEPHONE ENCOUNTER
Called and informed daughter about setting up appt with DR Valencia, they are in a drs office then she is going to call us back to schedule this.

## 2019-10-15 NOTE — TELEPHONE ENCOUNTER
I would suggest she have a cysto with hydro.  We need to get her scheduled with Dr. Valencia to discuss this.  She can come in for a repeat instillation until she can be seen by Dr. Valencia if she would like to.  Sometimes the repeat instillation is more helpful after the first 1-2.

## 2019-10-16 PROBLEM — N39.0 UTI (URINARY TRACT INFECTION): Status: RESOLVED | Noted: 2019-09-25 | Resolved: 2019-10-16

## 2019-10-16 PROBLEM — N39.0 RECURRENT UTI (URINARY TRACT INFECTION): Status: ACTIVE | Noted: 2019-10-16

## 2019-10-16 PROBLEM — N95.2 VAGINAL ATROPHY: Status: ACTIVE | Noted: 2019-10-16

## 2019-10-16 PROBLEM — Z85.3 PERSONAL HISTORY OF BREAST CANCER: Status: ACTIVE | Noted: 2019-10-16

## 2019-10-22 ENCOUNTER — OFFICE VISIT (OUTPATIENT)
Dept: UROGYNECOLOGY | Facility: CLINIC | Age: 84
End: 2019-10-22
Payer: MEDICARE

## 2019-10-22 VITALS
BODY MASS INDEX: 27.06 KG/M2 | SYSTOLIC BLOOD PRESSURE: 165 MMHG | HEIGHT: 62 IN | DIASTOLIC BLOOD PRESSURE: 72 MMHG | HEART RATE: 76 BPM | WEIGHT: 147.06 LBS

## 2019-10-22 DIAGNOSIS — N39.0 RECURRENT UTI: ICD-10-CM

## 2019-10-22 DIAGNOSIS — N95.2 ATROPHIC VAGINITIS: ICD-10-CM

## 2019-10-22 DIAGNOSIS — R35.0 URINARY FREQUENCY: Primary | ICD-10-CM

## 2019-10-22 DIAGNOSIS — R31.29 OTHER MICROSCOPIC HEMATURIA: ICD-10-CM

## 2019-10-22 LAB
BILIRUB SERPL-MCNC: ABNORMAL MG/DL
BLOOD URINE, POC: ABNORMAL
COLOR, POC UA: YELLOW
GLUCOSE UR QL STRIP: ABNORMAL
KETONES UR QL STRIP: ABNORMAL
LEUKOCYTE ESTERASE URINE, POC: ABNORMAL
NITRITE, POC UA: ABNORMAL
PH, POC UA: 5
PROTEIN, POC: ABNORMAL
SPECIFIC GRAVITY, POC UA: 1
UROBILINOGEN, POC UA: ABNORMAL

## 2019-10-22 PROCEDURE — 99999 PR PBB SHADOW E&M-EST. PATIENT-LVL III: ICD-10-PCS | Mod: PBBFAC,,, | Performed by: OBSTETRICS & GYNECOLOGY

## 2019-10-22 PROCEDURE — 99213 OFFICE O/P EST LOW 20 MIN: CPT | Mod: PBBFAC,PO | Performed by: OBSTETRICS & GYNECOLOGY

## 2019-10-22 PROCEDURE — 81002 URINALYSIS NONAUTO W/O SCOPE: CPT | Mod: PBBFAC,PO | Performed by: OBSTETRICS & GYNECOLOGY

## 2019-10-22 PROCEDURE — 99999 PR PBB SHADOW E&M-EST. PATIENT-LVL III: CPT | Mod: PBBFAC,,, | Performed by: OBSTETRICS & GYNECOLOGY

## 2019-10-22 PROCEDURE — 99213 PR OFFICE/OUTPT VISIT, EST, LEVL III, 20-29 MIN: ICD-10-PCS | Mod: S$PBB,,, | Performed by: OBSTETRICS & GYNECOLOGY

## 2019-10-22 PROCEDURE — 99213 OFFICE O/P EST LOW 20 MIN: CPT | Mod: S$PBB,,, | Performed by: OBSTETRICS & GYNECOLOGY

## 2019-10-22 RX ORDER — SULFAMETHOXAZOLE AND TRIMETHOPRIM 800; 160 MG/1; MG/1
1 TABLET ORAL DAILY
Qty: 42 TABLET | Refills: 1 | Status: SHIPPED | OUTPATIENT
Start: 2019-10-22 | End: 2020-03-05

## 2019-10-22 NOTE — PROGRESS NOTES
Subjective:     Chief Complaint:  Recurrent UTI  History of Present Illness: Antonia Maldonado is a 89 y.o. female attended by her daughter who presents for recurrent UTIs.  She went to urgent care twice.  She was told there was some blood in her urine at her last visit with her primary care physician.  On review of her recent urinalyses, there have been small amounts of blood few times but not always. The episodes started with sharp pain suprapubically and burning with urination and discomfort in her bottom. the symptoms have abated somewhat.  There have been a few nights when she has been unable to sleep but does not associate this with bladder pain.  We have 2 documented cultures showing E coli.  She says she has had at least 3 or 4 infections this year.  Even though she took appropriate antibiotics she felt that her symptoms did not resolve although they are not as severe as when this episode started.  She had instillation with gentamicin.  She has no known allergies    Review of Systems    Constitutional: No acute distress. No weight gain/loss.  Eyes: No vision changes.  ENT:  Hearing loss  Respiratory: No SOB.  Cardiovascular: No chest pain. No edema. Hypertension  Gastrointestinal:  Appendectomy  Genitourinary:  Hysterectomy for benign disease  Integument/Breast:  Breast CA  Hematologic/Lymphatic:  history of anemia.  Musculoskeletal: No back pain. No abdominal pain.  Neurological:  Neuropathy  Behavioral/Psych:  Insomnia   Endocrine:  Hypothyroid.  Allergy/Immune: no recent reactions     Objective:   General Exam:  General appearance: WDNF. NAD.   HEENT: [place]. EOM's intact.  Neck: Normal thyroid.   Back: No CVA tenderness.  RESP: No SOB.  Breasts: deferred  Abdomen: Benign without localizing signs.  Extremities:  Trace edema.   Lymphatic: noncontributory  Skin: No rashes. No lesions.  Neurologic: Intact.   Psych: Oriented.       Assessment:   Persistent symptoms post UTI despite normal urinalysis-probable  chronic cystitis which has been aggravated by the UTIs but can't rule out intraluminal lesions  Intermittent microscopic hematuria but none today       Plan:    Antibiotic suppression with Bactrim.  If she does not see improvement in symptoms within 2-3 weeks or if there is any blood on repeat urinalysis will undergo full evaluation for intraluminal lesions

## 2019-11-04 ENCOUNTER — TELEPHONE (OUTPATIENT)
Dept: UROGYNECOLOGY | Facility: CLINIC | Age: 84
End: 2019-11-04

## 2019-11-04 NOTE — TELEPHONE ENCOUNTER
Spoke with daughter of pt. She said she was told that if pt. was not better in 3 weeks to have u/sdonez as a well as cysto, but in [place], please advise as to who she should schedule with, or what the next step for her care is.

## 2019-11-04 NOTE — TELEPHONE ENCOUNTER
----- Message from Dara Pryor sent at 11/4/2019  3:06 PM CST -----  Contact: self  Type: Needs Medical Advice    Who Called:  self  Symptoms (please be specific):    How long has patient had these symptoms:    Pharmacy name and phone #:    Best Call Back Number: [phone] (home)   Additional Information: Patient would like a call back regarding the referral for a procedure. Patient needs the information so she can set up the appointment. Please call patient. Thanks!

## 2019-11-05 NOTE — TELEPHONE ENCOUNTER
----- Message from Ritika Marte sent at 11/5/2019  4:10 PM CST -----  Contact: patient  Daughter Lesli called requesting to speak with someone regarding who Dr. Neely is referring her to? Said she believes it was some sort of cysto scope or some sort of procedure like that that he no longer performs.       Please call Lesli to discuss...stated she was told she would get a call at 5pm yesterday and is still waiting, thanks!    [phone]

## 2019-11-05 NOTE — TELEPHONE ENCOUNTER
Called and spoke to daughter and informed that we have not heard about what dr Neely plans was for her mother. Daughter states he had mentioned and cysto or ultrasound. Please advise? Was wanting if all possible to get done in [place].

## 2019-11-14 ENCOUNTER — TELEPHONE (OUTPATIENT)
Dept: UROGYNECOLOGY | Facility: CLINIC | Age: 84
End: 2019-11-14

## 2019-11-14 RX ORDER — HYDROCORTISONE 25 MG/G
CREAM TOPICAL 3 TIMES DAILY
Qty: 28 G | Refills: 1 | Status: SHIPPED | OUTPATIENT
Start: 2019-11-14 | End: 2021-09-18 | Stop reason: CLARIF

## 2019-11-14 NOTE — TELEPHONE ENCOUNTER
----- Message from Jasmine De La Paz sent at 11/14/2019  4:41 PM CST -----  Contact: Domenica Silverio (Daughter)  Type: Needs Medical Advice    Who Called:  Domenica Silverio (Daughter)  Pharmacy name and phone #:    [id number] [place] - [address]  [address]  [place] [address]  Phone: [phone] Fax: [phone]  Best Call Back Number: [phone]  Additional Information: Calling to find out if patient can get a new prescription for anusol hc. Advised she was prescribed something else and it was too expensive but this option would be cheaper.

## 2019-11-14 NOTE — TELEPHONE ENCOUNTER
[facility] and that this was the medication that was too expensive.  I will send it in again to see if it is cheaper.  It looks as if the suppositories are not covered, but I have sent in the cream.

## 2019-11-15 ENCOUNTER — TELEPHONE (OUTPATIENT)
Dept: UROLOGY | Facility: CLINIC | Age: 84
End: 2019-11-15

## 2019-11-15 NOTE — TELEPHONE ENCOUNTER
----- Message from Francisca Dalton sent at 11/15/2019  8:15 AM CST -----  Contact: patient hildamj Metzger   Patient daughter need to speak to nurse regarding appt on the 26th , patient daughter will like to know what will be done during appt      Please call to advice [phone] please call between 12 and 1pm today please per pt daughter

## 2019-11-22 ENCOUNTER — OFFICE VISIT (OUTPATIENT)
Dept: UROLOGY | Facility: CLINIC | Age: 84
End: 2019-11-22
Payer: MEDICARE

## 2019-11-22 VITALS
SYSTOLIC BLOOD PRESSURE: 163 MMHG | HEIGHT: 62 IN | WEIGHT: 148.81 LBS | DIASTOLIC BLOOD PRESSURE: 66 MMHG | HEART RATE: 79 BPM | BODY MASS INDEX: 27.38 KG/M2

## 2019-11-22 DIAGNOSIS — N95.2 ATROPHIC VAGINITIS: ICD-10-CM

## 2019-11-22 DIAGNOSIS — N39.0 RECURRENT UTI: ICD-10-CM

## 2019-11-22 DIAGNOSIS — Z87.898 HISTORY OF DYSURIA: ICD-10-CM

## 2019-11-22 DIAGNOSIS — R31.29 MICROSCOPIC HEMATURIA: Primary | ICD-10-CM

## 2019-11-22 LAB
BILIRUB SERPL-MCNC: NORMAL MG/DL
BLOOD URINE, POC: NORMAL
COLOR, POC UA: YELLOW
GLUCOSE UR QL STRIP: NORMAL
KETONES UR QL STRIP: NORMAL
LEUKOCYTE ESTERASE URINE, POC: NORMAL
NITRITE, POC UA: NORMAL
PH, POC UA: 5
PROTEIN, POC: 30
SPECIFIC GRAVITY, POC UA: 1.02
UROBILINOGEN, POC UA: 0.2

## 2019-11-22 PROCEDURE — 1125F AMNT PAIN NOTED PAIN PRSNT: CPT | Mod: ,,, | Performed by: UROLOGY

## 2019-11-22 PROCEDURE — 99214 PR OFFICE/OUTPT VISIT, EST, LEVL IV, 30-39 MIN: ICD-10-PCS | Mod: S$PBB,,, | Performed by: UROLOGY

## 2019-11-22 PROCEDURE — 99999 PR PBB SHADOW E&M-EST. PATIENT-LVL III: ICD-10-PCS | Mod: PBBFAC,,, | Performed by: UROLOGY

## 2019-11-22 PROCEDURE — 99999 PR PBB SHADOW E&M-EST. PATIENT-LVL III: CPT | Mod: PBBFAC,,, | Performed by: UROLOGY

## 2019-11-22 PROCEDURE — 88112 PR  CYTOPATH, CELL ENHANCE TECH: ICD-10-PCS | Mod: 26,,, | Performed by: PATHOLOGY

## 2019-11-22 PROCEDURE — 1159F PR MEDICATION LIST DOCUMENTED IN MEDICAL RECORD: ICD-10-PCS | Mod: ,,, | Performed by: UROLOGY

## 2019-11-22 PROCEDURE — 1159F MED LIST DOCD IN RCRD: CPT | Mod: ,,, | Performed by: UROLOGY

## 2019-11-22 PROCEDURE — 88112 CYTOPATH CELL ENHANCE TECH: CPT | Mod: 26,,, | Performed by: PATHOLOGY

## 2019-11-22 PROCEDURE — 88112 CYTOPATH CELL ENHANCE TECH: CPT | Performed by: PATHOLOGY

## 2019-11-22 PROCEDURE — 99213 OFFICE O/P EST LOW 20 MIN: CPT | Mod: PBBFAC,PO | Performed by: UROLOGY

## 2019-11-22 PROCEDURE — 81002 URINALYSIS NONAUTO W/O SCOPE: CPT | Mod: PBBFAC,PO | Performed by: UROLOGY

## 2019-11-22 PROCEDURE — 1125F PR PAIN SEVERITY QUANTIFIED, PAIN PRESENT: ICD-10-PCS | Mod: ,,, | Performed by: UROLOGY

## 2019-11-22 PROCEDURE — 99214 OFFICE O/P EST MOD 30 MIN: CPT | Mod: S$PBB,,, | Performed by: UROLOGY

## 2019-11-22 RX ORDER — CHOLECALCIFEROL (VITAMIN D3) 25 MCG
1000 TABLET ORAL DAILY
COMMUNITY
End: 2020-03-05

## 2019-11-22 NOTE — PROGRESS NOTES
UROLOGY [place]  11 22 19    Cc rec uti, microhematuria      Age 89, comes in accompanied by granddaughter. Pt says she has had a number of instances where she felt like she had a uti, and the culture came back negative; and other times it was positive. She has a discomfort on the genital area, and at times it hurts to sit down. Has been noted to have microscopic hematuria numerous times; apparently no gross hematuria.     Had a hysterectomy about a couple of years ago.    Has not had any pneumaturia      PMH    Surgical:   Past Surgical History:   Procedure Laterality Date    APPENDECTOMY      bladder lift      GANGLION CYST EXCISION      HYSTERECTOMY      MASTECTOMY Left     TONSILLECTOMY         Medical:  has a past medical history of Adenocarcinoma, breast, Anemia, Anxiety and depression, Breast cancer, Depression, Hearing loss, Hypertension, Hypothyroidism, and Sleep apnea.    Familial: father had rectal cancer, brother had mesothelioma    Social: , lives in [place]    Meds:   Current Outpatient Medications on File Prior to Visit   Medication Sig Dispense Refill    amLODIPine (NORVASC) 5 MG tablet TAKE 1 TABLET BY MOUTH DAILY 90 tablet 0    hydrocortisone 2.5 % cream Apply topically 28 g 1    irbesartan (AVAPRO) 150 MG tablet TAKE 1 TABLET BY MOUTH EVERY DAY 90 tablet 0    levothyroxine (SYNTHROI TAKE 1 TABLET(12 30 tablet 9    phenyleph/mineral oil/petrolat (PREPARATION H RECT) Place rectally daily as needed.       pravastatin (PRAVACHOL) 20 MG tablet Take 1 tablet (20 mg total) by mouth o 90 tablet 3    sulfamethoxazole-trimethoprim 800-160mg (BACTRIM DS) 800-160 mg Tab Take 1 tablet by mouth once daily. 42 tablet 1    vitamin D (VITAMIN D3) 1000 units Tab Take 1,000 Units by mouth once daily.      zolpidem (AMBIEN CR) 12.5 MG CR tablet TAKE ONE TABLET BY MOUT 90 tablet 0     REVIEW OF SYSTEMS  GENERAL:  No headaches or dizzy spells.   HEENT: vision preserved. Sinuses: No complaints.    CARDIOPULMONARY: has arterial hypertension. no chest pain. No shortness of breath.   GASTROINTESTINAL: No heartburn. Denies diarrhea; denies constipation, no blood or mucus in stools.   GENITOURINARY: Denies dysuria, bleeding or incontinence.   MUSCULOSKELETAL: has arthritic complaints such as pain or stiffness.   PSYCHIATRIC: has some confusion. No history of depression or anxiety.   ENDOCRINOLOGIC: has thyroid disease being treated.  NEUROLOGICAL: No dizziness, syncope, paralysis  LYMPHATICS: No enlarged nodes. No history of splenectomy.  ==    Pt in no distress  HEENT: wnl.  Neck: supple, no JVD, no lymphadenopathy  Chest: CV NSR  Lungs: normal chest expansion, no labored breathing  Abdomen flat, nontender, no organomegaly, no masses.  Extremities: no edema, peripheral pulses nl  Neuro: preserved      //IMP    rec uti  Microscopic hematuria  Atrophic vaginitis  Chronic dysuria    Will need cystoscopy, upper tract imaging, urine cytology, bladder scan  Will get urine cytology, cystoscopy (will make sure no stitches in bladder from hysterectomy, renal ultrasound, bladder scan  Will discuss the following points:  -atrophic vaginitis and use of estrace vaginal cream  -hygienic measures for prevention of uti  -need for confirming suspected uti's with urinalysis and especially with urine culture  - the superior reliability of catheterized specimens over voided specimens to determine presence of true uti  - possible use of prophylactic antibiotics for uti prevention  - the notion of asymptomatic bacteriuria and bladder colonization

## 2019-11-22 NOTE — LETTER
November 22, 2019      Esvin Neely MD  [address]  [place] [address]           [facility]  [address] [place] [address]  Phone: [phone]  Fax: [phone]          Patient: Antonia Maldonado   MR Number: [id number]   YOB: 1930   Date of Visit: 11/22/2019       Dear Dr. Esvin Neely:    Thank you for referring Antonia Maldonado to me for evaluation. Attached you will find relevant portions of my assessment and plan of care.    If you have questions, please do not hesitate to call me. I look forward to following Antonia Maldonado along with you.    Sincerely,    Delvis Gandhi MD    Enclosure  CC:  No Recipients    If you would like to receive this communication electronically, please contact [email or web address] or [phone] to request more information on linkedFA Link access.    For providers and/or their staff who would like to refer a patient to [facility], please contact us through our one-stop-shop provider referral line, [facility], at [phone].    If you feel you have received this communication in error or would no longer like to receive these types of communications, please e-mail [email or web address]

## 2019-11-25 ENCOUNTER — HOSPITAL ENCOUNTER (OUTPATIENT)
Dept: RADIOLOGY | Facility: HOSPITAL | Age: 84
Discharge: HOME | End: 2019-11-25
Attending: UROLOGY
Payer: MEDICARE

## 2019-11-25 DIAGNOSIS — N39.0 RECURRENT UTI: ICD-10-CM

## 2019-11-25 DIAGNOSIS — R31.29 MICROSCOPIC HEMATURIA: ICD-10-CM

## 2019-11-25 PROCEDURE — 76770 US EXAM ABDO BACK WALL COMP: CPT | Mod: 26,,, | Performed by: RADIOLOGY

## 2019-11-25 PROCEDURE — 76770 US EXAM ABDO BACK WALL COMP: CPT | Mod: TC,PO

## 2019-11-25 PROCEDURE — 76770 US KIDNEY: ICD-10-PCS | Mod: 26,,, | Performed by: RADIOLOGY

## 2019-11-26 LAB — FINAL PATHOLOGIC DIAGNOSIS: NORMAL

## 2019-11-27 ENCOUNTER — PROCEDURE VISIT (OUTPATIENT)
Dept: UROLOGY | Facility: CLINIC | Age: 84
End: 2019-11-27
Payer: MEDICARE

## 2019-11-27 VITALS
HEIGHT: 62 IN | DIASTOLIC BLOOD PRESSURE: 77 MMHG | HEART RATE: 80 BPM | SYSTOLIC BLOOD PRESSURE: 177 MMHG | WEIGHT: 146.38 LBS | BODY MASS INDEX: 26.94 KG/M2

## 2019-11-27 DIAGNOSIS — N30.10 IC (INTERSTITIAL CYSTITIS): ICD-10-CM

## 2019-11-27 DIAGNOSIS — R33.9 INCOMPLETE BLADDER EMPTYING: ICD-10-CM

## 2019-11-27 DIAGNOSIS — N39.0 RECURRENT UTI: ICD-10-CM

## 2019-11-27 DIAGNOSIS — R31.29 MICROSCOPIC HEMATURIA: ICD-10-CM

## 2019-11-27 DIAGNOSIS — I73.9 IC (INTERMITTENT CLAUDICATION): Primary | ICD-10-CM

## 2019-11-27 LAB — POC RESIDUAL URINE VOLUME: 28 ML (ref 0–100)

## 2019-11-27 PROCEDURE — 51798 US URINE CAPACITY MEASURE: CPT | Mod: PBBFAC,PO | Performed by: UROLOGY

## 2019-11-27 PROCEDURE — 52000 CYSTOURETHROSCOPY: CPT | Mod: PBBFAC,PO | Performed by: UROLOGY

## 2019-11-27 PROCEDURE — 52000 CYSTOURETHROSCOPY: CPT | Mod: S$PBB,,, | Performed by: UROLOGY

## 2019-11-27 PROCEDURE — 52000 PR CYSTOURETHROSCOPY: ICD-10-PCS | Mod: S$PBB,,, | Performed by: UROLOGY

## 2019-11-27 RX ORDER — AMITRIPTYLINE HYDROCHLORIDE 50 MG/1
50 TABLET, FILM COATED ORAL NIGHTLY
Qty: 30 TABLET | Refills: 5 | Status: SHIPPED | OUTPATIENT
Start: 2019-11-27 | End: 2020-05-25

## 2019-11-27 RX ORDER — HYDROXYZINE HYDROCHLORIDE 50 MG/1
50 TABLET, FILM COATED ORAL 2 TIMES DAILY
Qty: 60 TABLET | Refills: 5 | Status: SHIPPED | OUTPATIENT
Start: 2019-11-27 | End: 2020-06-03

## 2019-11-27 NOTE — PROCEDURES
Procedures   UROLOGY [place]  11 27 19     Cc rec uti, microhematuria       Age 89, comes in accompanied by daughter this time. Pt ia having a lot of pain on the genital area and deeper, in the bladder. She is much bothered by this pain, it is almost constant. It is worse when the bladder gets full.     She often has microhematuria.      Had a hysterectomy about a couple of years ago.     Has not had any pneumaturia      HEENT: wnl.  Neck: supple, no JVD, no lymphadenopathy  Chest: CV NSR  Lungs: normal chest expansion, no labored breathing  Abdomen flat, nontender, no organomegaly, no masses.  External genitalia with marked atrophic changes, urethral meatus small. No pelvic masses, uterus absent.      CYSTOSCOPY olympus flexible. Urethra with no stricture or diverticulum. Bladder cavity distends symmetrically and equally when distended with water. Mucosal layer shows multiple lesiona, in the form of patchy erythema looking extremely red and in some cases bleeding. Some glomerulations seen. No formal ulcerations. No exophytic growths. No abnormal trabeculation. Location and shape of the ureteral orifices normal. Pt tolerated the procedure poorly because of reduced bladder capacity.     BLADDERSCAN ULTRASOUND  28  ml residual    URINE CYTOLOGY no malignant cells    RENAL ULTRASOUND 11 25 19   1. Sonographic findings which are most compatible with medical renal disease.  2. No evidence of obstructive uropathy, renal stones, or solid renal mass in this patient with a provided history of hematuria.        //IMP     Interstitial cystitis, moderately severe    Microscopic hematuria is well explained by her current finding.  Atrophic vaginitis  Chronic dysuria also consequence of the IC     I explained to pt and daughter the theory explaining IC, and potential forms of treatment    I suggest she start with classic triad of elmiron 100 tid, the antihistaminic atarax (50 bid), and the antidepressant elavil (50 qhs) (all  three ordered)    Vaginal suppositories with lidocaine, baclofen and diazepam are a possibility    Also, use of Prelief, pyridium, uribel, bladder relaxants as needed.    May need bladder cocktails with therapeutic agents.

## 2019-12-09 ENCOUNTER — TELEPHONE (OUTPATIENT)
Dept: UROLOGY | Facility: CLINIC | Age: 84
End: 2019-12-09

## 2019-12-09 NOTE — TELEPHONE ENCOUNTER
----- Message from Ritika Marte sent at 12/9/2019  2:50 PM CST -----  Contact: Domenica  Daughter requesting a call back in regards to difficulties with some medications, and status of pre-auth for one of the meds that he provided    Please call Domenica woods at: [phone]

## 2019-12-11 ENCOUNTER — TELEPHONE (OUTPATIENT)
Dept: UROLOGY | Facility: CLINIC | Age: 84
End: 2019-12-11

## 2019-12-11 NOTE — TELEPHONE ENCOUNTER
----- Message from Xiao Curtis sent at 12/11/2019 11:50 AM CST -----  Type:  Pharmacy Calling to Clarify an RX    Name of Caller: Scott DENNEY  Pharmacy Name:  [facility]  Prescription Name:  pentosan polysulfate (ELMIRON) 100 mg Cap  What do they need to clarify?:  Prior auth dept needs the diagnosis for this medication ref# [id number]  Best Call Back Number: [phone]  Additional Information:  Thank you!

## 2020-03-05 PROBLEM — E78.5 HYPERLIPIDEMIA: Status: ACTIVE | Noted: 2020-03-05

## 2020-03-05 PROBLEM — N30.10 INTERSTITIAL CYSTITIS: Status: ACTIVE | Noted: 2020-03-05

## 2020-05-24 DIAGNOSIS — I73.9 IC (INTERMITTENT CLAUDICATION): ICD-10-CM

## 2020-05-25 RX ORDER — AMITRIPTYLINE HYDROCHLORIDE 50 MG/1
TABLET, FILM COATED ORAL
Qty: 30 TABLET | Refills: 5 | Status: SHIPPED | OUTPATIENT
Start: 2020-05-25 | End: 2020-12-02 | Stop reason: SDUPTHER

## 2020-05-29 DIAGNOSIS — I73.9 IC (INTERMITTENT CLAUDICATION): ICD-10-CM

## 2020-06-03 RX ORDER — HYDROXYZINE HYDROCHLORIDE 50 MG/1
TABLET, FILM COATED ORAL
Qty: 60 TABLET | Refills: 5 | Status: SHIPPED | OUTPATIENT
Start: 2020-06-03 | End: 2021-02-15

## 2020-12-02 DIAGNOSIS — I73.9 IC (INTERMITTENT CLAUDICATION): ICD-10-CM

## 2020-12-02 RX ORDER — AMITRIPTYLINE HYDROCHLORIDE 50 MG/1
50 TABLET, FILM COATED ORAL NIGHTLY
Qty: 30 TABLET | Refills: 5 | Status: SHIPPED | OUTPATIENT
Start: 2020-12-02 | End: 2021-06-28

## 2021-09-18 PROBLEM — Z71.89 ACP (ADVANCE CARE PLANNING): Status: ACTIVE | Noted: 2021-09-18

## 2021-09-18 PROBLEM — R94.31 ABNORMAL ELECTROCARDIOGRAM: Status: ACTIVE | Noted: 2021-09-18

## 2021-09-18 PROBLEM — N12 PYELONEPHRITIS: Status: ACTIVE | Noted: 2021-09-18

## 2021-09-18 PROBLEM — S72.001A CLOSED DISPLACED FRACTURE OF RIGHT FEMORAL NECK: Status: ACTIVE | Noted: 2021-09-18

## 2021-09-18 PROBLEM — D64.9 NORMOCYTIC ANEMIA: Status: ACTIVE | Noted: 2021-09-18

## 2021-09-20 DIAGNOSIS — I73.9 IC (INTERMITTENT CLAUDICATION): ICD-10-CM

## 2021-09-20 PROBLEM — N12 PYELONEPHRITIS: Status: RESOLVED | Noted: 2021-09-18 | Resolved: 2021-09-20

## 2021-09-20 PROBLEM — R82.90 ABNORMAL URINALYSIS: Status: ACTIVE | Noted: 2021-09-20

## 2021-09-20 RX ORDER — HYDROXYZINE HYDROCHLORIDE 50 MG/1
50 TABLET, FILM COATED ORAL 2 TIMES DAILY
Qty: 60 TABLET | Refills: 5 | Status: SHIPPED | OUTPATIENT
Start: 2021-09-20 | End: 2021-09-22 | Stop reason: SDUPTHER

## 2021-09-23 ENCOUNTER — TELEPHONE (OUTPATIENT)
Dept: ORTHOPEDICS | Facility: CLINIC | Age: 86
End: 2021-09-23

## 2021-09-23 DIAGNOSIS — S72.001A CLOSED DISPLACED FRACTURE OF RIGHT FEMORAL NECK: Primary | ICD-10-CM

## 2021-09-30 DIAGNOSIS — S72.001A CLOSED DISPLACED FRACTURE OF RIGHT FEMORAL NECK: Primary | ICD-10-CM

## 2021-10-05 ENCOUNTER — HOSPITAL ENCOUNTER (OUTPATIENT)
Dept: RADIOLOGY | Facility: HOSPITAL | Age: 86
Discharge: HOME | End: 2021-10-05
Attending: ORTHOPAEDIC SURGERY
Payer: MEDICARE

## 2021-10-05 ENCOUNTER — OFFICE VISIT (OUTPATIENT)
Dept: ORTHOPEDICS | Facility: CLINIC | Age: 86
End: 2021-10-05
Payer: OTHER MISCELLANEOUS

## 2021-10-05 VITALS — HEIGHT: 62 IN | WEIGHT: 128 LBS | BODY MASS INDEX: 23.55 KG/M2

## 2021-10-05 DIAGNOSIS — S72.001A CLOSED DISPLACED FRACTURE OF RIGHT FEMORAL NECK: ICD-10-CM

## 2021-10-05 DIAGNOSIS — S72.001A CLOSED DISPLACED FRACTURE OF RIGHT FEMORAL NECK: Primary | ICD-10-CM

## 2021-10-05 PROCEDURE — 99213 OFFICE O/P EST LOW 20 MIN: CPT | Mod: PBBFAC,PN | Performed by: ORTHOPAEDIC SURGERY

## 2021-10-05 PROCEDURE — 99999 PR PBB SHADOW E&M-EST. PATIENT-LVL III: ICD-10-PCS | Mod: PBBFAC,,, | Performed by: ORTHOPAEDIC SURGERY

## 2021-10-05 PROCEDURE — 73502 X-RAY EXAM HIP UNI 2-3 VIEWS: CPT | Mod: TC,PO,RT

## 2021-10-05 PROCEDURE — 99024 PR POST-OP FOLLOW-UP VISIT: ICD-10-PCS | Mod: S$GLB,,, | Performed by: ORTHOPAEDIC SURGERY

## 2021-10-05 PROCEDURE — 99999 PR PBB SHADOW E&M-EST. PATIENT-LVL III: CPT | Mod: PBBFAC,,, | Performed by: ORTHOPAEDIC SURGERY

## 2021-10-05 PROCEDURE — 73502 X-RAY EXAM HIP UNI 2-3 VIEWS: CPT | Mod: 26,RT,, | Performed by: RADIOLOGY

## 2021-10-05 PROCEDURE — 73502 XR ORTHO PELVIS_HIP POST OP RIGHT: ICD-10-PCS | Mod: 26,RT,, | Performed by: RADIOLOGY

## 2021-10-05 PROCEDURE — 99024 POSTOP FOLLOW-UP VISIT: CPT | Mod: S$GLB,,, | Performed by: ORTHOPAEDIC SURGERY

## 2021-11-12 DIAGNOSIS — S72.001A CLOSED DISPLACED FRACTURE OF RIGHT FEMORAL NECK: Primary | ICD-10-CM

## 2021-11-16 ENCOUNTER — OFFICE VISIT (OUTPATIENT)
Dept: ORTHOPEDICS | Facility: CLINIC | Age: 86
End: 2021-11-16
Payer: MEDICARE

## 2021-11-16 ENCOUNTER — HOSPITAL ENCOUNTER (OUTPATIENT)
Dept: RADIOLOGY | Facility: HOSPITAL | Age: 86
Discharge: HOME | End: 2021-11-16
Attending: ORTHOPAEDIC SURGERY
Payer: MEDICARE

## 2021-11-16 VITALS — WEIGHT: 128 LBS | HEIGHT: 62 IN | BODY MASS INDEX: 23.55 KG/M2

## 2021-11-16 DIAGNOSIS — S72.001A CLOSED DISPLACED FRACTURE OF RIGHT FEMORAL NECK: Primary | ICD-10-CM

## 2021-11-16 DIAGNOSIS — S72.001A CLOSED DISPLACED FRACTURE OF RIGHT FEMORAL NECK: ICD-10-CM

## 2021-11-16 DIAGNOSIS — M85.861 OTHER SPECIFIED DISORDERS OF BONE DENSITY AND STRUCTURE, RIGHT LOWER LEG: ICD-10-CM

## 2021-11-16 PROCEDURE — 73502 XR ORTHO PELVIS_HIP POST OP RIGHT: ICD-10-PCS | Mod: 26,RT,, | Performed by: RADIOLOGY

## 2021-11-16 PROCEDURE — 99024 PR POST-OP FOLLOW-UP VISIT: ICD-10-PCS | Mod: POP,,, | Performed by: ORTHOPAEDIC SURGERY

## 2021-11-16 PROCEDURE — 99024 POSTOP FOLLOW-UP VISIT: CPT | Mod: POP,,, | Performed by: ORTHOPAEDIC SURGERY

## 2021-11-16 PROCEDURE — 73502 X-RAY EXAM HIP UNI 2-3 VIEWS: CPT | Mod: TC,PO,RT

## 2021-11-16 PROCEDURE — 99213 OFFICE O/P EST LOW 20 MIN: CPT | Mod: PBBFAC,PN | Performed by: ORTHOPAEDIC SURGERY

## 2021-11-16 PROCEDURE — 99999 PR PBB SHADOW E&M-EST. PATIENT-LVL III: ICD-10-PCS | Mod: PBBFAC,,, | Performed by: ORTHOPAEDIC SURGERY

## 2021-11-16 PROCEDURE — 99999 PR PBB SHADOW E&M-EST. PATIENT-LVL III: CPT | Mod: PBBFAC,,, | Performed by: ORTHOPAEDIC SURGERY

## 2021-11-16 PROCEDURE — 73502 X-RAY EXAM HIP UNI 2-3 VIEWS: CPT | Mod: 26,RT,, | Performed by: RADIOLOGY

## 2021-11-22 ENCOUNTER — HOSPITAL ENCOUNTER (OUTPATIENT)
Dept: RADIOLOGY | Facility: HOSPITAL | Age: 86
Discharge: HOME | End: 2021-11-22
Attending: ORTHOPAEDIC SURGERY
Payer: MEDICARE

## 2021-11-22 DIAGNOSIS — M85.861 OTHER SPECIFIED DISORDERS OF BONE DENSITY AND STRUCTURE, RIGHT LOWER LEG: ICD-10-CM

## 2021-11-22 DIAGNOSIS — S72.001A CLOSED DISPLACED FRACTURE OF RIGHT FEMORAL NECK: ICD-10-CM

## 2021-11-22 PROCEDURE — 77080 DXA BONE DENSITY AXIAL: CPT | Mod: TC,PO

## 2021-11-22 PROCEDURE — 77080 DXA BONE DENSITY AXIAL: CPT | Mod: 26,,, | Performed by: RADIOLOGY

## 2021-11-22 PROCEDURE — 77080 DEXA BONE DENSITY SPINE HIP: ICD-10-PCS | Mod: 26,,, | Performed by: RADIOLOGY

## 2021-12-20 DIAGNOSIS — R27.0 ATAXIA: Primary | ICD-10-CM

## 2022-01-19 ENCOUNTER — CLINICAL SUPPORT (OUTPATIENT)
Dept: REHABILITATION | Facility: HOSPITAL | Age: 87
End: 2022-01-19
Payer: COMMERCIAL

## 2022-01-19 DIAGNOSIS — Z74.09 IMPAIRED FUNCTIONAL MOBILITY, BALANCE, GAIT, AND ENDURANCE: ICD-10-CM

## 2022-01-19 DIAGNOSIS — R27.0 ATAXIA: ICD-10-CM

## 2022-01-19 DIAGNOSIS — R29.898 BILATERAL LEG WEAKNESS: ICD-10-CM

## 2022-01-19 PROCEDURE — 97161 PT EVAL LOW COMPLEX 20 MIN: CPT | Mod: PN

## 2022-01-19 PROCEDURE — 97110 THERAPEUTIC EXERCISES: CPT | Mod: PN

## 2022-01-19 NOTE — PLAN OF CARE
[facility]  Physical Therapy Initial Evaluation    Name: Antonia Maldonado  Clinic Number: [id number]    Therapy Diagnosis:   Encounter Diagnoses   Name Primary?    Ataxia     Bilateral leg weakness     Impaired functional mobility, balance, gait, and endurance        Physician: Remigio Granda II, MD      Physician Orders: PT Eval and Treat   Medical Diagnosis from Referral: R27.0 (ICD-10-CM) - Ataxia  Evaluation Date: 1/19/2022  Authorization Period Expiration: 12/31/2022  Plan of Care Expiration: 4/1/2022  Visit # / Visits authorized: 1/ 1    Time In: 2:05 pm  Time Out: 3:15 pm  Total Billable Time: 70 minutes    Precautions: Standard, Fall and hard of hearing, HTN, osteopenia      Subjective   Date of onset 9/20/21 TKA surgery on R  History of current condition - Antonia Beltrán reports: Pt fell on 9/18/2021 and fractured her hip. Had a TKA on R hip and was receiving inpatient therapy from 9/22/2021-November of 2021. Lives on her own. Her house is two-story but is currently only using the ground level for safety. Would like to return to being able to use her upstairs. She has a bath downstairs. Has grab bars, shower chair, elevated toilet seat, walker, and wheelchair. Wants to get back to walking without AD and was previously driving and be as independent as possible.     Medical History:   Past Medical History:   Diagnosis Date    Adenocarcinoma, breast     Anemia     Anxiety and depression     Breast cancer     Depression     Hearing loss     Hypertension     Hypothyroidism     Sleep apnea        Surgical History:   Antonia Maldonado  has a past surgical history that includes Mastectomy (Left); Tonsillectomy; Appendectomy; Hysterectomy; Ganglion cyst excision; bladder lift; and Hemiarthroplasty of hip (Right, 9/20/2021).    Medications:   Antonia has a current medication list which includes the following prescription(s): acetaminophen, alendronate, amitriptyline, amlodipine, aspirin,  docusate sodium, elmiron, hydroxyzine, irbesartan, levothyroxine, magnesium hydroxide 400 mg/5 ml, mineral oil-hydrophil petrolat, phenyleph/mineral oil/petrolat, polyethylene glycol, polyethylene glycol, pravastatin, xylimelts, and zolpidem.    Allergies:   Review of patient's allergies indicates:  No Known Allergies       Imaging, X-ray pelvis: 11/12/2021  COMPARISON:  10/05/2021     FINDINGS:  There is a well aligned well-seated right total hip arthroplasty.  No fracture or subluxation are identified.  The pelvis is intact.  Degenerative changes are present in the lower lumbar spine.     Impression:  Well aligned well-seated right total hip arthroplasty.    Prior Therapy: yes for balance in 2019  Social History: lives alone  Falls: 1 fall when attempting to get rolling pin from upper cabinet while on step stool about 2 weeks ago   DME: Manual wheelchair, Walker, Shower chair and grab bars   Home Environment: two story home but is on the first floor  Exercise Routine / History: no exercises   Family Present at time of Eval: daughter   Occupation: retired   Prior Level of Function: driving, independent with all ADL's and household duties, living alone, walking without AD  Current Level of Function: walking with FWW, use of grab bars, shower chair, elevated toilet seat, wheelchair. Not able to drive, unable to go upstairs in her house.    Pain:  Current 0/10, worst 0/10, best 0/10   No  Pain in hips. Daughter reports she gets headaches, but that has been happening for years    Pts goals: wants to return to walking without AD, using upstairs bedroom, return to driving.    Objective       Observation: internal IR of RLE in standing and supine    Posture Alignment: limb length discrepancy when measured in supine. No rotation of pelvis noted. No change in length with supine to long sit. RLE is 81.75 cm, left is 80 cm.     GAIT DEVIATIONS: Antonia ambulates using FWW and 3 point gait. She displays decreased stance time on  "R. Internal rotation of RLE    Hip Range of Motion:   Left active Right active    Flexion 120 deg 115 deg   Abduction 40 40   Extension 2 0     Lower Extremity Strength   RLE LLE   Hip Flexion: 3+/5 4/5   Hip Extension:  2/5 3-/5   Hip Abduction: 3/5 4-/5   Hip Adduction: 2-/5 2-/5   Knee Extension: 4/5 5/5   Knee Flexion: 3+/5 4+/5   Ankle Dorsiflexion: 3+/5 4-/5   Ankle Plantarflexion: 3+/5 4+/5       Flexibility: hamstring and gastroc tightness     Joint Mobility: hypomobile R hip in anterior and posterior directions    Palpation: no tenderness to palpation reported    Edema: none observed      CMS Impairment/Limitation/Restriction for FOTO Non-Traumatic CNS Dysfunction Survey    Therapist reviewed FOTO scores for Antonia Maldonado on 1/19/2022.   FOTO documents entered into [employer] - see Media section.    Limitation Score: 55%  Category: Mobility    Current : CK = at least 40% but < 60% impaired, limited or restricted  Goal: CJ = at least 20% but < 40% impaired, limited or restricted  Discharge:          TREATMENT   Treatment Time In: 2:50 p m  Treatment Time Out: 3:15 pm  Total Treatment time separate from Evaluation: 25 minutes    Antonia Beltrán received therapeutic exercises to develop strength, endurance, ROM and flexibility for 25 minutes including:    Bridges x 10  SLR x 10 on R  Hip abd with YTB 10 x 3"  Ball squeezes 10 x 3"  Clamshells on R x 10    NV: TUG, gait speed, sit to stand test, assess heel lift on L  Sit to stand from elevated mat  Standing hip abd  Standing hip ext  Balance   Gait training with cane    Home Exercises and Patient Education Provided    Education provided:   - Role of PT/POC  - raising from chair without pulling up on walker  - waiting for clearance from PT before walking with cane or without AD    Written Home Exercises Provided: yes.  Exercises were reviewed and Antonia Beltrán was able to demonstrate them prior to the end of the session.  Antonia Beltrán demonstrated good  understanding of the education " provided.     See EMR under Patient Instructions for exercises provided 1/19/2022.    Assessment   Antonia is a 89 y.o. female referred to outpatient Physical Therapy with a medical diagnosis of ataxia. Pt presents status post four months of right total hip arthroplasty. She displays decreased strength of R hip, decreased range of motion, leg length discrepancy, gait with use of FWW, impaired balance due to report of fall in home, and decreased independence with functional activities. She would benefit from therapy to return to independent living including walking without assistive device, climbing stairs in home alone, performing all household duties without assistance, and return to driving.     Pt prognosis is Good.   Pt will benefit from skilled outpatient Physical Therapy to address the deficits stated above and in the chart below, provide pt/family education, and to maximize pt's level of independence.     Plan of care discussed with patient: Yes  Pt's spiritual, cultural and educational needs considered and patient is agreeable to the plan of care and goals as stated below:     Anticipated Barriers for therapy: age,     Medical Necessity is demonstrated by the following  History  Co-morbidities and personal factors that may impact the plan of care Co-morbidities:   advanced age, anxiety and anemia, hearing loss, HTN, hypothyroidism, sleep apnea, osteopenia    Personal Factors:   age  impulsiveness to return to PLOF     low   Examination  Body Structures and Functions, activity limitations and participation restrictions that may impact the plan of care Body Regions:   lower extremities  trunk    Body Systems:    gross symmetry  ROM  strength  gross coordinated movement  gait    Participation Restrictions:   Unable to drive, unable to ascend stairs, walking with FWW    Activity limitations:   Learning and applying knowledge  no deficits    General Tasks and Commands  no deficits    Communication  hard of  hearing    Mobility  walking  moving around using equipment (WC)  driving (bike, car, motorcycle)    Self care  washing oneself (bathing, drying, washing hands)  toileting  dressing  looking after one's health    Domestic Life  doing house work (cleaning house, washing dishes, laundry)    Interactions/Relationships  no deficits    Life Areas  no deficits    Community and Social Life  community life  recreation and leisure         Complexity: low  See FOTO outcome assessment    Clinical Presentation stable and uncomplicated low   Decision Making/ Complexity Score: low     GOALS: Short Term Goals:  4 weeks  1. Pt to demonstrate ascending stairs with reciprocal gait 4 steps x 5 without break to be able to ascend flight of stairs at home to enter master bedroom with single handrail and/or use of cane.   2. Increased hip MMT 1/2  to increase tolerance for ADL and work activities.  3. Pt to ambulate with use of single point cane or LRAD on variable surfaces without loss of balance for 400'.  4. Pt to tolerate HEP to improve ROM and independence with ADL's.  5. Pt to report no difficulty with light household activities.    Long Term Goals:  8 weeks  1.Pt to demonstrate ascending and descending stairs with reciprocal gait and use of single handrail to get to and from master bedroom independently.  2.Pt to demonstrate rotation of R hip equal to L to ambulate with decreased IR on R.  3.Increased hip MMT 1 grade  to increase tolerance for ADL and work activities.  4. Pt will ambulate without AD on variable surfaces without loss of balance to return to prior level of function.   5. Pt to demonstrate R hip flexion strength, plantar flexion strength, and glute strength of >/=4/5 to be able to return to driving tasks including pushing break and gas, and lifting leg between pedals without difficulty.    Plan   Plan of care Certification: 1/19/2022 to 4/1/2022.    Outpatient Physical Therapy 2 times weekly for 8 weeks to include the  following interventions: Electrical Stimulation NMES, Gait Training, Manual Therapy, Moist Heat/ Ice, Neuromuscular Re-ed, Patient Education, Therapeutic Activities and Therapeutic Exercise.         Cynthia Meier, PT

## 2022-02-04 ENCOUNTER — CLINICAL SUPPORT (OUTPATIENT)
Dept: REHABILITATION | Facility: HOSPITAL | Age: 87
End: 2022-02-04
Payer: MEDICARE

## 2022-02-04 DIAGNOSIS — R29.898 BILATERAL LEG WEAKNESS: ICD-10-CM

## 2022-02-04 DIAGNOSIS — Z74.09 IMPAIRED FUNCTIONAL MOBILITY, BALANCE, GAIT, AND ENDURANCE: ICD-10-CM

## 2022-02-04 PROCEDURE — 97110 THERAPEUTIC EXERCISES: CPT | Mod: PN

## 2022-02-04 PROCEDURE — 97112 NEUROMUSCULAR REEDUCATION: CPT | Mod: PN

## 2022-02-04 PROCEDURE — 97116 GAIT TRAINING THERAPY: CPT | Mod: PN

## 2022-02-04 NOTE — PROGRESS NOTES
"  Physical Therapy Daily Treatment Note     Name: Antonia Maldonado  Clinic Number: [id number]    Therapy Diagnosis:   Encounter Diagnoses   Name Primary?    Bilateral leg weakness     Impaired functional mobility, balance, gait, and endurance      Physician: Remigio Granda II, MD    Visit Date: 2/4/2022    Physician Orders: PT Eval and Treat   Medical Diagnosis from Referral: R27.0 (ICD-10-CM) - Ataxia  Evaluation Date: 1/19/2022  Authorization Period Expiration: 12/31/2022  Plan of Care Expiration: 4/1/2022  Visit # / Visits authorized: 1/ 20+eval  PN Due: 2/19/2022     Time In: 2:13 pm  Time Out: 3:08 pm  Total Billable Time: 55 minutes    Precautions: Standard, Fall and hard of hearing, HTN, osteopenia    Subjective     Pt reports: She has been not as consistent over the last few days with her exercises due to illness. She was initially performing her exercises 1x/day with 10 repetitions. No pain reported in her hip since she has been wearing heel lift recommended at evaluation.     She was compliant with home exercise program.  Response to previous treatment: good  Functional change: too soon    Pain: 0/10  Location:  NA     Objective     Antonia Beltrán received therapeutic exercises to develop strength, endurance and flexibility for 32 minutes including:    Bridges 2x 10  SLR x 10 on R  Hip abd with YTB 2x10 hold 3"  Ball squeezes 20 x 3"  Clamshells R only due to pain x 10     Sit to stand from elevated mat x 15 with UE  Standing hip abd x 10  Standing hip ext x 10  Standing hip flexion x 10    NV: TUG, gait speed, sit to stand test    Antonia Beltrán participated in neuromuscular re-education activities to improve: Balance, Proprioception and Posture for 8 minutes. The following activities were included:    NBOS on floor eyes open 2 x 30"  NBOS on floor eyes closed 2 x 30"  Tandem stance 2 x 30" each  March 2 x 1'    Antonia Beltrán participated in gait training to improve functional mobility and safety for 15 minutes, " including:    3 point gait Training with SBQC 2 x 60', 2 x 30' with VCs for technique   Pre-gait with initial swing to midstance x 10 each    Home Exercises Provided and Patient Education Provided     Education provided:   Cont to perform HEP as provided.     Written Home Exercises Provided: yes.  Exercises were reviewed and Antonia Beltrán was able to demonstrate them prior to the end of the session.  Antonia Beltrán demonstrated good  understanding of the education provided.     See EMR under Patient Instructions for exercises provided 2/4/2022.    Assessment     Antonia Beltrán tolerated initial treatment following evaluation well today. Has no pain reported in hip since start of using heel lift in left after initial evaluation. Has not bee consistent with exercise over last few days due to illness, but was performing exercises daily prior to that. Pt instructed in gait with small base quad cane with bandwidth feedback given for 3 point gait to begin. Discussed with pt and pt's daughter to not purchase cane for use at home until instructed by PT. Progressed standing strengthening and challenged balance with fair to fair+ balance today. Continue to progress gait and balance training to improve independence.    Antonia Beltrán Is progressing well towards her goals.   Pt prognosis is Good.     Pt will continue to benefit from skilled outpatient physical therapy to address the deficits listed in the problem list box on initial evaluation, provide pt/family education and to maximize pt's level of independence in the home and community environment.     Pt's spiritual, cultural and educational needs considered and pt agreeable to plan of care and goals.    Anticipated barriers to physical therapy: age    Goals: Short Term Goals:  4 weeks (progressing, not met)  1. Pt to demonstrate ascending stairs with reciprocal gait 4 steps x 5 without break to be able to ascend flight of stairs at home to enter master bedroom with single handrail and/or use of  cane.   2. Increased hip MMT 1/2  to increase tolerance for ADL and work activities.  3. Pt to ambulate with use of single point cane or LRAD on variable surfaces without loss of balance for 400'.  4. Pt to tolerate HEP to improve ROM and independence with ADL's.  5. Pt to report no difficulty with light household activities.     Long Term Goals:  8 weeks  1.Pt to demonstrate ascending and descending stairs with reciprocal gait and use of single handrail to get to and from master bedroom independently.  2.Pt to demonstrate rotation of R hip equal to L to ambulate with decreased IR on R.  3.Increased hip MMT 1 grade  to increase tolerance for ADL and work activities.  4. Pt will ambulate without AD on variable surfaces without loss of balance to return to prior level of function.   5. Pt to demonstrate R hip flexion strength, plantar flexion strength, and glute strength of >/=4/5 to be able to return to driving tasks including pushing break and gas, and lifting leg between pedals without difficulty    Plan     Certification date: 1/19/2022 to 4/1/2022.     Outpatient Physical Therapy 2 times weekly for 8 weeks to include the following interventions: Electrical Stimulation NMES, Gait Training, Manual Therapy, Moist Heat/ Ice, Neuromuscular Re-ed, Patient Education, Therapeutic Activities and Therapeutic Exercise.     Cont skilled PT session towards PT and patient's goals.    Cynthia Meier, PT   02/04/2022

## 2022-02-09 ENCOUNTER — TELEPHONE (OUTPATIENT)
Dept: HEMATOLOGY/ONCOLOGY | Facility: CLINIC | Age: 87
End: 2022-02-09
Payer: MEDICARE

## 2022-02-09 DIAGNOSIS — C43.9 SKIN CANCER (MELANOMA): Primary | ICD-10-CM

## 2022-02-09 NOTE — NURSING
Received referral from Dr. Thompson to see Dr. Mejia in the Melanoma clinic. Spoke with patient and daughter. Appt scheduled for 2/17/22 @ 2:00 which will follow Dr. Garibay's appt. Directions to cancer center and my contact number provided to daughter. Daughter reports patient has history of Breast Cancer that was treated by Dr. Ortiz. Breast pathology in [place]. Instructed daughter to contact the navigation office with any concerns    Oncology Navigation   Intake  Cancer Type: Melanoma  Internal / External Referral: External  Referral Source: Paper referral from Dr. Vielka Thompson  Date of Referral: 2/8/2022  Initial Nurse Navigator Contact: 2/9/2022  Referral to Initial Contact Timeline (days): 1  Date Worked: 2/9/2022  First Appointment Available: 2/17/2022  Appointment Date: 2/17/2022  Schedule to Appointment Timeline (days): 8  First Available Date vs. Scheduled Date (days): 0     Treatment                              Acuity      Follow Up  No follow-ups on file.

## 2022-02-11 DIAGNOSIS — S72.001A CLOSED DISPLACED FRACTURE OF RIGHT FEMORAL NECK: Primary | ICD-10-CM

## 2022-02-17 ENCOUNTER — OFFICE VISIT (OUTPATIENT)
Dept: ORTHOPEDICS | Facility: CLINIC | Age: 87
End: 2022-02-17
Payer: MEDICARE

## 2022-02-17 ENCOUNTER — HOSPITAL ENCOUNTER (OUTPATIENT)
Dept: RADIOLOGY | Facility: HOSPITAL | Age: 87
Discharge: HOME | End: 2022-02-17
Attending: ORTHOPAEDIC SURGERY
Payer: MEDICARE

## 2022-02-17 ENCOUNTER — OFFICE VISIT (OUTPATIENT)
Dept: HEMATOLOGY/ONCOLOGY | Facility: CLINIC | Age: 87
End: 2022-02-17
Payer: MEDICARE

## 2022-02-17 VITALS
HEIGHT: 62 IN | HEART RATE: 92 BPM | RESPIRATION RATE: 16 BRPM | DIASTOLIC BLOOD PRESSURE: 79 MMHG | WEIGHT: 119.5 LBS | BODY MASS INDEX: 21.99 KG/M2 | SYSTOLIC BLOOD PRESSURE: 168 MMHG | OXYGEN SATURATION: 98 %

## 2022-02-17 VITALS — WEIGHT: 128 LBS | BODY MASS INDEX: 23.55 KG/M2 | HEIGHT: 62 IN

## 2022-02-17 DIAGNOSIS — S72.001A CLOSED DISPLACED FRACTURE OF RIGHT FEMORAL NECK: ICD-10-CM

## 2022-02-17 DIAGNOSIS — C43.62 MALIGNANT MELANOMA OF LEFT FOREARM: ICD-10-CM

## 2022-02-17 DIAGNOSIS — Z90.12 HISTORY OF MODIFIED RADICAL MASTECTOMY OF LEFT BREAST: Primary | ICD-10-CM

## 2022-02-17 DIAGNOSIS — S72.001A CLOSED DISPLACED FRACTURE OF RIGHT FEMORAL NECK: Primary | ICD-10-CM

## 2022-02-17 PROCEDURE — 99999 PR PBB SHADOW E&M-EST. PATIENT-LVL IV: ICD-10-PCS | Mod: PBBFAC,,, | Performed by: ORTHOPAEDIC SURGERY

## 2022-02-17 PROCEDURE — 99999 PR PBB SHADOW E&M-EST. PATIENT-LVL V: ICD-10-PCS | Mod: PBBFAC,,, | Performed by: SURGERY

## 2022-02-17 PROCEDURE — 99999 PR PBB SHADOW E&M-EST. PATIENT-LVL IV: CPT | Mod: PBBFAC,,, | Performed by: ORTHOPAEDIC SURGERY

## 2022-02-17 PROCEDURE — 99205 PR OFFICE/OUTPT VISIT, NEW, LEVL V, 60-74 MIN: ICD-10-PCS | Mod: S$PBB,,, | Performed by: SURGERY

## 2022-02-17 PROCEDURE — 99205 OFFICE O/P NEW HI 60 MIN: CPT | Mod: S$PBB,,, | Performed by: SURGERY

## 2022-02-17 PROCEDURE — 99214 OFFICE O/P EST MOD 30 MIN: CPT | Mod: S$PBB,,, | Performed by: ORTHOPAEDIC SURGERY

## 2022-02-17 PROCEDURE — 73502 XR ORTHO PELVIS_HIP POST OP RIGHT: ICD-10-PCS | Mod: 26,RT,, | Performed by: RADIOLOGY

## 2022-02-17 PROCEDURE — 99999 PR PBB SHADOW E&M-EST. PATIENT-LVL V: CPT | Mod: PBBFAC,,, | Performed by: SURGERY

## 2022-02-17 PROCEDURE — 99214 PR OFFICE/OUTPT VISIT, EST, LEVL IV, 30-39 MIN: ICD-10-PCS | Mod: S$PBB,,, | Performed by: ORTHOPAEDIC SURGERY

## 2022-02-17 PROCEDURE — 99215 OFFICE O/P EST HI 40 MIN: CPT | Mod: PBBFAC,PN | Performed by: SURGERY

## 2022-02-17 PROCEDURE — 99214 OFFICE O/P EST MOD 30 MIN: CPT | Mod: PBBFAC,27,PN | Performed by: ORTHOPAEDIC SURGERY

## 2022-02-17 PROCEDURE — 73502 X-RAY EXAM HIP UNI 2-3 VIEWS: CPT | Mod: 26,RT,, | Performed by: RADIOLOGY

## 2022-02-17 PROCEDURE — 73502 X-RAY EXAM HIP UNI 2-3 VIEWS: CPT | Mod: TC,PO,RT

## 2022-02-17 NOTE — PROGRESS NOTES
SUBJECTIVE:     History of Present Illness:  Very pleasant 89-year-old female referred today with newly diagnosed left forearm melanoma.  Patient with complex medical history including recent hip fracture of which she required inpatient rehabilitation.  Fortunately she has recovered nicely from this.  During this process she began to notice changes to a left forearm skin lesion which had been present for several years.  Initially it was a small raised lesion in during the time of her hip fracture a became larger with noted bleeding and subsequent changes.  She was subsequently seen by Dr. Vielka Thompson whom performed shave biopsy on 02/01/2022.  Pathology consistent with malignant melanoma Breslow depth at least 2.7 mm with deep and lateral margins positive.  Pathology report describes no evidence of ulceration despite history of bleeding.  Jose Roberto level 4 with 2 mitoses present.  Pathologic stage at least a T3 a lesion.  Past medical history also includes a locally aggressive left breast cancer status post left modified radical mastectomy in 2004.  The patient underwent complete axillary lymph node dissection with 20 to of 23 lymph nodes involved with metastatic carcinoma.  Patient received adjuvant chemotherapy and radiation and continues to ZOHREH.  Patient established with an continues to follow w/ Dr. Otriz.    Chief Complaint   Patient presents with    Consult     Melanoma, Vielka Thompson    Melanoma       Review of patient's allergies indicates:  No Known Allergies    Current Outpatient Medications   Medication Sig Dispense Refill    acetaminophen (TYLENOL) 325 MG tablet Take 650 mg by mouth every 6 (six) hours as needed for Pain or Temperature greater than.      alendronate (FOSAMAX) 70 MG tablet Take 1 tablet (70 mg total) by mouth every 7 days. 4 tablet 11    amitriptyline (ELAVIL) 50 MG tablet TAKE 1 TABLET(50 MG) BY MOUTH EVERY EVENING 30 tablet 5    amLODIPine (NORVASC) 5 MG tablet TAKE 1 TABLET  BY MOUTH DAILY 90 tablet 0    docusate sodium (COLACE) 100 MG capsule Take 1 capsule (100 mg total) by mouth 2 (two) times daily.  0    ELMIRON 100 mg Cap TAKE 1 CAPSULE(100 MG) BY MOUTH THREE TIMES DAILY 90 capsule 11    hydrOXYzine (ATARAX) 50 MG tablet Take 1 tablet (50 mg total) by mouth 2 (two) times daily as needed for Itching or Anxiety. (Patient taking differently: Take 50 mg by mouth every 12 (twelve) hours as needed for Anxiety or Itching.) 60 tablet 5    irbesartan (AVAPRO) 150 MG tablet TAKE 1 TABLET BY MOUTH EVERY DAY 90 tablet 0    levothyroxine (SYNTHROID) 150 MCG tablet Take 1 tablet by mouth before breakfast.      magnesium hydroxide 400 mg/5 ml (MILK OF MAGNESIA) 400 mg/5 mL Susp Take 30 mLs by mouth every 3 (three) hours as needed (constipation). May give 30ml every 3 hours while awake until has BM, if has not had BM in 72 hrs      mineral oil-hydrophil petrolat Oint Apply 1 application topically once daily.      phenyleph/mineral oil/petrolat (PREPARATION H RECT) Place 1 application rectally daily as needed (Hemorrhoids).      polyethylene glycol (GLYCOLAX) 17 gram PwPk Take 17 g by mouth daily as needed (constipation).      polyethylene glycol (GLYCOLAX) 17 gram/dose powder Take 17 g by mouth every 12 (twelve) hours as needed (constipation).      pravastatin (PRAVACHOL) 20 MG tablet Take 1 tablet (20 mg total) by mouth once daily. 90 tablet 0    xylitoL (XYLIMELTS) 550 mg MaBT Place 1 tablet inside cheek every 8 (eight) hours as needed (dry mouth). Place and Dissolve 1 tab in mouth in cheek      zolpidem (AMBIEN) 5 MG Tab TAKE 1 TABLET(5 MG) BY MOUTH EVERY NIGHT AS NEEDED 30 tablet 3    aspirin 325 MG tablet Take 1 tablet (325 mg total) by mouth once daily. for 14 days 14 tablet 0     No current facility-administered medications for this visit.       Past Medical History:   Diagnosis Date    Adenocarcinoma, breast     Anemia     Anxiety and depression     Breast cancer      "Depression     Hearing loss     Hypertension     Hypothyroidism     Sleep apnea      Past Surgical History:   Procedure Laterality Date    APPENDECTOMY      bladder lift      GANGLION CYST EXCISION      HEMIARTHROPLASTY OF HIP Right 2021    Procedure: HEMIARTHROPLASTY, HIP;  Surgeon: Chau Garibay MD;  Location: [facility];  Service: Orthopedics;  Laterality: Right;    HYSTERECTOMY      MASTECTOMY Left     TONSILLECTOMY       Family History   Problem Relation Age of Onset    Cancer Father         rectal    Cancer Brother         mesothyleoma     Social History     Tobacco Use    Smoking status: Former Smoker     Types: Cigarettes     Quit date:      Years since quittin.1    Smokeless tobacco: Never Used   Substance Use Topics    Alcohol use: Yes     Comment: occasionally        Review of Systems:  Review of Systems   Constitutional: Negative for appetite change, chills and fever.   Respiratory: Negative for cough, chest tightness and shortness of breath.    Cardiovascular: Negative for chest pain and palpitations.   Gastrointestinal: Negative for abdominal pain.   Neurological: Negative for headaches.   Hematological: Negative for adenopathy. Does not bruise/bleed easily.       OBJECTIVE:     Vital Signs (Most Recent)  Pulse: 92 (22 1406)  Resp: 16 (22 1406)  BP: (!) 168/79 (22 1406)  SpO2: 98 % (22 1406)  5' 2" (1.575 m)  54.2 kg (119 lb 7.8 oz)     Physical Exam:  Physical Exam  Constitutional:       General: She is not in acute distress.     Appearance: She is not ill-appearing or toxic-appearing.      Comments: Ambulates with walker   HENT:      Head: Normocephalic and atraumatic.   Cardiovascular:      Rate and Rhythm: Normal rate.      Pulses: Normal pulses.   Pulmonary:      Effort: Pulmonary effort is normal. No respiratory distress.      Breath sounds: No wheezing.   Chest:   Breasts:      Right: No axillary adenopathy or supraclavicular adenopathy.      " Left: No axillary adenopathy or supraclavicular adenopathy.       Lymphadenopathy:      Cervical:      Right cervical: No superficial, deep or posterior cervical adenopathy.     Left cervical: No superficial, deep or posterior cervical adenopathy.      Upper Body:      Right upper body: No supraclavicular, axillary or pectoral adenopathy.      Left upper body: No supraclavicular, axillary or pectoral adenopathy.      Comments: Clear evidence of left axillary lymph node dissection with paucity of soft tissue present   Skin:            Comments: Left upper extremity without evidence of satellite lesion or palpable subcutaneous mass   Neurological:      General: No focal deficit present.      Mental Status: She is alert and oriented to person, place, and time.             Diagnostic Results:  As discussed in HPI    Assessment:       1. History of modified radical mastectomy of left breast    2. Malignant melanoma of left forearm        Plan:       Thorough review of previous medical records regarding left breast cancer and surgical resection.  Extensive conversation with both the patient and daughter present regarding newly diagnosed left forearm malignant melanoma.  Patient with at least a T3 lesion based on incomplete excision site.  Complex situation given patient meets clear criteria for sentinel lymph node biopsy however status post previous left axillary lymph node dissection.  I am fairly confident she will not have any localization of attempted sentinel lymph node biopsy.  Given the underlying pathologic findings I do believe patient would likely benefit from preoperative PET scan to appropriately stage the patient.  Plan to personally reach out to patient's medical oncologist to confirm preoperative plan.  Pending results of PET scan we have also discussed surgical principles based on NCCN guidelines for wide local excision of malignant melanoma.  Representation of wide local excision graphically has been  described to both the patient and daughter accompanying her.  Multiple well formed questions have been answered to their satisfaction.  They have verbalized their understanding and are in agreement with current recommendations.  Pending PET scan results will likely plan for wide local excision to follow.  Greater than 60 minute spent in patient care, greater than half of which in face-to-face evaluation

## 2022-02-17 NOTE — PROGRESS NOTES
Chief Complaint   Patient presents with    Right Hip - Post-op Evaluation       HPI:   This is a 89 y.o. who returns today status post right hip hemiarthroplasty 4 months ago. Patient has been FWB.  Pain is dull. No numbness or tingling. No associated signs or symptoms.    Past Medical History:   Diagnosis Date    Adenocarcinoma, breast     Anemia     Anxiety and depression     Breast cancer     Depression     Hearing loss     Hypertension     Hypothyroidism     Sleep apnea      Past Surgical History:   Procedure Laterality Date    APPENDECTOMY      bladder lift      GANGLION CYST EXCISION      HEMIARTHROPLASTY OF HIP Right 9/20/2021    Procedure: HEMIARTHROPLASTY, HIP;  Surgeon: Chau Garibay MD;  Location: [facility];  Service: Orthopedics;  Laterality: Right;    HYSTERECTOMY      MASTECTOMY Left     TONSILLECTOMY       Current Outpatient Medications on File Prior to Visit   Medication Sig Dispense Refill    acetaminophen (TYLENOL) 325 MG tablet Take 650 mg by mouth every 6 (six) hours as needed for Pain or Temperature greater than.      alendronate (FOSAMAX) 70 MG tablet Take 1 tablet (70 mg total) by mouth every 7 days. 4 tablet 11    amitriptyline (ELAVIL) 50 MG tablet TAKE 1 TABLET(50 MG) BY MOUTH EVERY EVENING 30 tablet 5    amLODIPine (NORVASC) 5 MG tablet TAKE 1 TABLET BY MOUTH DAILY 90 tablet 0    docusate sodium (COLACE) 100 MG capsule Take 1 capsule (100 mg total) by mouth 2 (two) times daily.  0    ELMIRON 100 mg Cap TAKE 1 CAPSULE(100 MG) BY MOUTH THREE TIMES DAILY 90 capsule 11    hydrOXYzine (ATARAX) 50 MG tablet Take 1 tablet (50 mg total) by mouth 2 (two) times daily as needed for Itching or Anxiety. (Patient taking differently: Take 50 mg by mouth every 12 (twelve) hours as needed for Anxiety or Itching.) 60 tablet 5    irbesartan (AVAPRO) 150 MG tablet TAKE 1 TABLET BY MOUTH EVERY DAY 90 tablet 0    levothyroxine (SYNTHROID) 150 MCG tablet Take 1 tablet by mouth  before breakfast.      magnesium hydroxide 400 mg/5 ml (MILK OF MAGNESIA) 400 mg/5 mL Susp Take 30 mLs by mouth every 3 (three) hours as needed (constipation). May give 30ml every 3 hours while awake until has BM, if has not had BM in 72 hrs      mineral oil-hydrophil petrolat Oint Apply 1 application topically once daily.      phenyleph/mineral oil/petrolat (PREPARATION H RECT) Place 1 application rectally daily as needed (Hemorrhoids).      polyethylene glycol (GLYCOLAX) 17 gram PwPk Take 17 g by mouth daily as needed (constipation).      polyethylene glycol (GLYCOLAX) 17 gram/dose powder Take 17 g by mouth every 12 (twelve) hours as needed (constipation).      pravastatin (PRAVACHOL) 20 MG tablet Take 1 tablet (20 mg total) by mouth once daily. 90 tablet 0    xylitoL (XYLIMELTS) 550 mg MaBT Place 1 tablet inside cheek every 8 (eight) hours as needed (dry mouth). Place and Dissolve 1 tab in mouth in cheek      zolpidem (AMBIEN) 5 MG Tab TAKE 1 TABLET(5 MG) BY MOUTH EVERY NIGHT AS NEEDED 30 tablet 3    aspirin 325 MG tablet Take 1 tablet (325 mg total) by mouth once daily. for 14 days 14 tablet 0     No current facility-administered medications on file prior to visit.     Review of patient's allergies indicates:  No Known Allergies  Family History   Problem Relation Age of Onset    Cancer Father         rectal    Cancer Brother         mesothyleoma     Social History     Socioeconomic History    Marital status:    Tobacco Use    Smoking status: Former Smoker     Types: Cigarettes     Quit date:      Years since quittin.1    Smokeless tobacco: Never Used   Substance and Sexual Activity    Alcohol use: Yes     Comment: occasionally       Review of Systems:  Constitutional:  Denies fever or chills   Eyes:  Denies change in visual acuity   HENT:  Denies nasal congestion or sore throat   Respiratory:  Denies cough or shortness of breath   Cardiovascular:  Denies chest pain or edema   GI:   Denies abdominal pain, nausea, vomiting, bloody stools or diarrhea   :  Denies dysuria   Integument:  Denies rash   Neurologic:  Denies headache, focal weakness or sensory changes   Endocrine:  Denies polyuria or polydipsia   Lymphatic:  Denies swollen glands   Psychiatric:  Denies depression or anxiety     Physical Exam:   Constitutional:  Well developed, well nourished, no acute distress, non-toxic appearance   Integument:  Well hydrated, no rash   Lymphatic:  No lymphadenopathy noted   Neurologic:  Alert & oriented x 3, CN 2-12 normal, normal motor function, normal sensory function, no focal deficits noted   Psychiatric:  Speech and behavior appropriate   Gi: abdomen soft  Eyes: EOMI    L hip  Exam performed same as contralateral side and is normal    R hip  FROM with no pain. Overall normal alignment. Skin intact. Compartments soft. NVi distally.     X-rays were performed today, personally reviewed by me and findings discussed with the patient.  2 views of the right hip show implants intact in good alignment      Closed displaced fracture of right femoral neck        Continue as tolerated. RTC as needed.

## 2022-02-22 ENCOUNTER — TELEPHONE (OUTPATIENT)
Dept: HEMATOLOGY/ONCOLOGY | Facility: CLINIC | Age: 87
End: 2022-02-22
Payer: MEDICARE

## 2022-02-22 NOTE — NURSING
Received call from daughter informing she hasn't heard from anyone regarding the orders for PET scan. Dr. Mejia had spoken with Dr. Ortiz during melanoma clinic and had agreed that Dr. Ortiz would order the PET Scan.  Faxed Dr. Mejia's note to Dr. Ortiz's office and left a msg for Dr. Ortiz's nurse to call with any questions. Left msg on chuck Metzger's VM with this informations and requested she contact navigation office with any questions.

## 2022-02-23 ENCOUNTER — TELEPHONE (OUTPATIENT)
Dept: HEMATOLOGY/ONCOLOGY | Facility: CLINIC | Age: 87
End: 2022-02-23
Payer: MEDICARE

## 2022-02-23 NOTE — NURSING
Received call from daughter. She has not yet heard from Dr. Ortiz's office regarding PET. Spoke with María at Dr. Ortiz's office and she will contact Dr. Ortiz and follow up with patient. Instructed daughter to contact me if she has any further concerns and she verbalized understanding

## 2022-03-17 PROBLEM — C43.62 MALIGNANT MELANOMA OF LEFT FOREARM: Status: ACTIVE | Noted: 2022-03-17

## 2022-04-01 ENCOUNTER — TELEPHONE (OUTPATIENT)
Dept: HEMATOLOGY/ONCOLOGY | Facility: CLINIC | Age: 87
End: 2022-04-01
Payer: MEDICARE

## 2022-04-01 NOTE — NURSING
Spoke wit daughter, Domenica and informed her of patient's follow up appt with Dr. Mejia at [facility] on 6/16/22 @1:30. Path report faxed to Dr. Thompson's office. She verbalized understanding

## 2022-05-06 ENCOUNTER — CLINICAL SUPPORT (OUTPATIENT)
Dept: REHABILITATION | Facility: HOSPITAL | Age: 87
End: 2022-05-06
Payer: MEDICARE

## 2022-05-06 DIAGNOSIS — R29.898 BILATERAL LEG WEAKNESS: Primary | ICD-10-CM

## 2022-05-06 DIAGNOSIS — Z74.09 IMPAIRED FUNCTIONAL MOBILITY, BALANCE, GAIT, AND ENDURANCE: ICD-10-CM

## 2022-05-06 PROCEDURE — 97116 GAIT TRAINING THERAPY: CPT | Mod: PN

## 2022-05-06 PROCEDURE — 97110 THERAPEUTIC EXERCISES: CPT | Mod: PN

## 2022-05-06 NOTE — PROGRESS NOTES
Physical Therapy Reassessment and Daily Treatment Note     Name: Antonia Maldonado  Clinic Number: [id number]    Therapy Diagnosis:   Encounter Diagnoses   Name Primary?    Bilateral leg weakness Yes    Impaired functional mobility, balance, gait, and endurance        Physician: Remigio Granda II, MD    Visit Date: 5/6/2022    Physician Orders: PT Eval and Treat   Medical Diagnosis from Referral: R27.0 (ICD-10-CM) - Ataxia  Evaluation Date: 1/19/2022 (reassessed 5/6/2022 after lapse in treatment)  Authorization Period Expiration: 12/31/2022  Plan of Care Expiration: 7/6/2022  Visit # / Visits authorized: 2/ 20+eval  PN Due: 5/29/2022     Time In: 1:07 pm  Time Out: 2:05 pm  Total Billable Time: 58 minutes    Precautions: Standard, Fall and hard of hearing, HTN, osteopenia    Subjective     Pt reports: She has been doing well, stopped previously due to melanoma diagnosis and treatment for removal. She received a cancer free assessment from the physicians and is looking to continue with therapy for strengthening and gait without AD.    She was compliant with home exercise program.  Response to previous treatment: good  Functional change: too soon    Pain: 0/10  Location:  NA     Objective     Observation: minimal IR to neutral position of R foot in standing (previously moderate IR in standing and supine)     Posture Alignment: limb length discrepancy when measured in supine. No rotation of pelvis noted. No change in length with supine to long sit. RLE is 81.50 cm, left is 80 cm.      GAIT DEVIATIONS: Antonia ambulates using FWW with very minimal UE support, mainly used for security. Reciprocal pattern with improved toe off.    Without AD: decreased julián and decreased stance time on R with asymmetrical step length     Hip Range of Motion:    Left active Right active    Flexion 120 deg 115 deg   Abduction 40 40   Extension 4 3      Lower Extremity Strength    RLE LLE   Hip Flexion: 3+/5 4/5   Hip Extension:  3+/5 3+/5  "  Hip Abduction: 3/5 4-/5   Hip Adduction: 3+/5 3+/5   Knee Extension: 4/5 5/5   Knee Flexion: 3+/5 4+/5   Ankle Dorsiflexion: 3+/5 4-/5   Ankle Plantarflexion: 3+/5 4+/5      TUG: 15 sec with FWW     Flexibility: hamstring and gastroc tightness                Palpation: no tenderness to palpation reported     Edema: none observed      Antonia Beltrán received therapeutic exercises to develop strength, endurance and flexibility for 43 minutes including:    Bridges 2x 10  SLR x 10 on R  Hip abd with YTB 2x10 hold 3"  Ball squeezes 20 x 3"  Clamshells R only due to pain x 10    NP today:   Sit to stand from elevated mat x 15 with UE  Standing hip abd x 10  Standing hip ext x 10  Standing hip flexion x 10    NV: gait speed, sit to stand test    Antonia Beltrán participated in neuromuscular re-education activities to improve: Balance, Proprioception and Posture for 00 minutes. The following activities were included:    NBOS on floor eyes open 2 x 30"  NBOS on floor eyes closed 2 x 30"  Tandem stance 2 x 30" each  March 2 x 1'    Antonia Beltrán participated in gait training to improve functional mobility and safety for 15 minutes, including:    3 point gait Training with SPC, 2 x 30' with VCs for technique   +gait without AD, CGA 2 x 30'      Home Exercises Provided and Patient Education Provided     Education provided:   Cont to perform HEP as provided.     Written Home Exercises Provided: yes.  Exercises were reviewed and Antonia Beltrán was able to demonstrate them prior to the end of the session.  Antonia Beltrán demonstrated good  understanding of the education provided.     See EMR under Patient Instructions for exercises provided 2/4/2022.    Assessment     Antonia Beltrán was reassessed by PT today after lapse in treatment of 3 months due to receiving treatment for skin cancer. She has returned to PT to work on progressing strength and balance to ambulate without AD. She demonstrates improved hip strength bilaterally to grossly 3+/5. Improved gait " pattern demonstrated with use of front wheeled walker, use of AD for security only. Gait without AD performed with gait belt donned and CGA, with decreased stance time on R and asymmetric stride with L step shorter than R. Modified heel lift used in L shoe to accommodate 1.5 cm difference in leg length, previously supporting 1.3 cm difference. Continue to progress gait and balance training to improve independence. Pt would benefit from continued physical therapy 2x/week for 8 weeks.    Antonia Beltrán Is progressing well towards her goals.   Pt prognosis is Good.     Pt will continue to benefit from skilled outpatient physical therapy to address the deficits listed in the problem list box on initial evaluation, provide pt/family education and to maximize pt's level of independence in the home and community environment.     Pt's spiritual, cultural and educational needs considered and pt agreeable to plan of care and goals.    Anticipated barriers to physical therapy: age    Goals: Short Term Goals:  4 weeks (progressing, not met)  1. Pt to demonstrate ascending stairs with reciprocal gait 4 steps x 5 without break to be able to ascend flight of stairs at home to enter master bedroom with single handrail and/or use of cane.   2. Increased hip MMT 1/2  to increase tolerance for ADL and work activities.  3. Pt to ambulate with use of single point cane or LRAD on variable surfaces without loss of balance for 400'.  4. Pt to tolerate HEP to improve ROM and independence with ADL's.  5. Pt to report no difficulty with light household activities.     Long Term Goals:  8 weeks  1.Pt to demonstrate ascending and descending stairs with reciprocal gait and use of single handrail to get to and from master bedroom independently.  2.Pt to demonstrate rotation of R hip equal to L to ambulate with decreased IR on R.  3.Increased hip MMT 1 grade  to increase tolerance for ADL and work activities.  4. Pt will ambulate without AD on variable  surfaces without loss of balance to return to prior level of function.   5. Pt to demonstrate R hip flexion strength, plantar flexion strength, and glute strength of >/=4/5 to be able to return to driving tasks including pushing break and gas, and lifting leg between pedals without difficulty    Plan     Certification date: 7/6/2022.     Outpatient Physical Therapy 2 times weekly for 8 weeks to include the following interventions: Electrical Stimulation NMES, Gait Training, Manual Therapy, Moist Heat/ Ice, Neuromuscular Re-ed, Patient Education, Therapeutic Activities and Therapeutic Exercise.     Cont skilled PT session towards PT and patient's goals.    Cynthia eMier, PT   05/06/2022

## 2022-05-13 ENCOUNTER — CLINICAL SUPPORT (OUTPATIENT)
Dept: REHABILITATION | Facility: HOSPITAL | Age: 87
End: 2022-05-13
Payer: MEDICARE

## 2022-05-13 DIAGNOSIS — R29.898 BILATERAL LEG WEAKNESS: Primary | ICD-10-CM

## 2022-05-13 DIAGNOSIS — Z74.09 IMPAIRED FUNCTIONAL MOBILITY, BALANCE, GAIT, AND ENDURANCE: ICD-10-CM

## 2022-05-13 PROCEDURE — 97116 GAIT TRAINING THERAPY: CPT | Mod: PN

## 2022-05-13 PROCEDURE — 97112 NEUROMUSCULAR REEDUCATION: CPT | Mod: PN

## 2022-05-13 PROCEDURE — 97110 THERAPEUTIC EXERCISES: CPT | Mod: PN

## 2022-05-13 NOTE — PROGRESS NOTES
"  Physical Therapy Daily Treatment Note     Name: Antonia Maldonado  Clinic Number: [id number]    Therapy Diagnosis:   Encounter Diagnoses   Name Primary?    Bilateral leg weakness Yes    Impaired functional mobility, balance, gait, and endurance        Physician: Remigio Granda II, MD    Visit Date: 5/13/2022    Physician Orders: PT Eval and Treat   Medical Diagnosis from Referral: R27.0 (ICD-10-CM) - Ataxia  Evaluation Date: 1/19/2022 (reassessed 5/6/2022 after lapse in treatment)  Authorization Period Expiration: 12/31/2022  Plan of Care Expiration: 7/6/2022  Visit # / Visits authorized: 3/ 20+eval  PN Due: 5/29/2022     Time In: 1:07 pm  Time Out: 2:02 pm  Total Billable Time: 55 minutes    Precautions: Standard, Fall and hard of hearing, HTN, osteopenia    Subjective     Pt reports: She has been performing the exercises and has questions including: "Shoulder exercises be performed bilaterally?" and "Are there more exercises you are going to give me?" (questions were addressed during treatment) She reports she was able to ascend flight of stairs to retrieve outfit from previous bedroom closet holding onto single handrail and ascending/descending laterally with step-to gait    She was compliant with home exercise program.  Response to previous treatment: good  Functional change: too soon    Pain: 0/10  Location:  NA     Objective     Antonia Beltrán received therapeutic exercises to develop strength, endurance and flexibility for 32 minutes including:    Bridges 2x 10  SLR x 10 on R  Hip abd with RTB 2x10 hold 3"  Pilates ring squeezes 20 x 3"  Clamshells x 15-mod A on R    Sit to stand from elevated mat x 15 with UE-NP today  Standing hip abd x 10  Standing hip ext 2x 10  Standing hip flexion x 10    +step up/down on bottom step x 10 each (forward)      NV: gait speed, sit to stand test    Antonia Beltrán participated in neuromuscular re-education activities to improve: Balance, Proprioception and Posture for 15 minutes. The " "following activities were included:    NBOS on floor eyes open 2 x 30"  NBOS on floor eyes closed 2 x 30"  Tandem stance 2 x 30" each  +marches with BUE>no UE hold, Min A when not using UE x 20 alt  +lateral steps over 5 cones with BUE>single UE x 3 laps, SBA    Antonia Beltrán participated in gait training to improve functional mobility and safety for 8 minutes, including:    3 point gait Training with SPC, 2 x 30' with VCs for technique   gait without AD x 120', CGA-Min A      Home Exercises Provided and Patient Education Provided     Education provided:   Cont to perform HEP as provided.     Written Home Exercises Provided: yes.  Exercises were reviewed and Antonia Beltrán was able to demonstrate them prior to the end of the session.  Antonia Beltrán demonstrated good  understanding of the education provided.     See EMR under Patient Instructions for exercises provided 2/4/2022.    Assessment     Antonia Beltrán arriving with FWW for confidence, no significant reliance for stability. Fearful of gait without assisted device. Attempted single point cane, and patient is safe but has difficulty with sequencing and technique. Gait training without AD with fair balance, CGA-Min A from therapist intermittently and shortened stride due to poor confidence in balance and strength. Progressed various hip and lower leg exercises to allow for ascending/descending stairs independently in home and improve gait pattern without AD. Continue to progress gait and balance training to improve independence.     Antonia Beltrán Is progressing well towards her goals.   Pt prognosis is Good.     Pt will continue to benefit from skilled outpatient physical therapy to address the deficits listed in the problem list box on initial evaluation, provide pt/family education and to maximize pt's level of independence in the home and community environment.     Pt's spiritual, cultural and educational needs considered and pt agreeable to plan of care and goals.    Anticipated " barriers to physical therapy: age    Goals: Short Term Goals:  4 weeks (progressing, not met)  1. Pt to demonstrate ascending stairs with reciprocal gait 4 steps x 5 without break to be able to ascend flight of stairs at home to enter master bedroom with single handrail and/or use of cane.   2. Increased hip MMT 1/2  to increase tolerance for ADL and work activities.  3. Pt to ambulate with use of single point cane or LRAD on variable surfaces without loss of balance for 400'.  4. Pt to tolerate HEP to improve ROM and independence with ADL's.  5. Pt to report no difficulty with light household activities.     Long Term Goals:  8 weeks  1.Pt to demonstrate ascending and descending stairs with reciprocal gait and use of single handrail to get to and from master bedroom independently.  2.Pt to demonstrate rotation of R hip equal to L to ambulate with decreased IR on R.  3.Increased hip MMT 1 grade  to increase tolerance for ADL and work activities.  4. Pt will ambulate without AD on variable surfaces without loss of balance to return to prior level of function.   5. Pt to demonstrate R hip flexion strength, plantar flexion strength, and glute strength of >/=4/5 to be able to return to driving tasks including pushing break and gas, and lifting leg between pedals without difficulty    Plan     Certification date: 7/6/2022.     Outpatient Physical Therapy 2 times weekly for 8 weeks to include the following interventions: Electrical Stimulation NMES, Gait Training, Manual Therapy, Moist Heat/ Ice, Neuromuscular Re-ed, Patient Education, Therapeutic Activities and Therapeutic Exercise.     Cont skilled PT session towards PT and patient's goals.    Cynthia Meier, PT   05/13/2022

## 2022-05-20 ENCOUNTER — CLINICAL SUPPORT (OUTPATIENT)
Dept: REHABILITATION | Facility: HOSPITAL | Age: 87
End: 2022-05-20
Payer: MEDICARE

## 2022-05-20 DIAGNOSIS — Z74.09 IMPAIRED FUNCTIONAL MOBILITY, BALANCE, GAIT, AND ENDURANCE: ICD-10-CM

## 2022-05-20 DIAGNOSIS — R29.898 BILATERAL LEG WEAKNESS: Primary | ICD-10-CM

## 2022-05-20 PROCEDURE — 97110 THERAPEUTIC EXERCISES: CPT | Mod: PN

## 2022-05-20 PROCEDURE — 97116 GAIT TRAINING THERAPY: CPT | Mod: PN

## 2022-05-20 NOTE — PROGRESS NOTES
"  Physical Therapy Daily Treatment Note     Name: Antonia Maldonado  Clinic Number: [id number]    Therapy Diagnosis:   Encounter Diagnoses   Name Primary?    Bilateral leg weakness Yes    Impaired functional mobility, balance, gait, and endurance        Physician: Remigio Granda II, MD    Visit Date: 5/20/2022    Physician Orders: PT Eval and Treat   Medical Diagnosis from Referral: R27.0 (ICD-10-CM) - Ataxia  Evaluation Date: 1/19/2022 (reassessed 5/6/2022 after lapse in treatment)  Authorization Period Expiration: 12/31/2022  Plan of Care Expiration: 7/6/2022  Visit # / Visits authorized: 4/ 20+eval  PN Due: 5/29/2022     Time In: 1:04 pm  Time Out: 1:58 pm  Total Billable Time: 54 minutes    Precautions: Standard, Fall and hard of hearing, HTN, osteopenia    Subjective     Pt reports: She almost did not come today but she had difficulty sleeping last night. Has continued to ambulate with and without AD at home. Without AD is for short distances on level surface. She does not feel stable when walking outside to walk without AD.    She was compliant with home exercise program.  Response to previous treatment: good  Functional change: too soon    Pain: 0/10  Location:  NA     Objective     Antonia Beltrán received therapeutic exercises to develop strength, endurance and flexibility for 39 minutes including:    Bridges x15 (returnt o 20 NV)  SLR x 10 on R  Hip abd with G loop 2x10 hold 3"  Pilates ring squeezes 15 x 3"  Clamshells x 15-mod A on R    Sit to stand from elevated mat x 15 with UE-NP today  Standing hip abd x 10  Standing hip ext 2x 10  Standing hip flexion x 10    step up/down on bottom step x 10 each (forward)      NV: gait speed, sit to stand test    Antonia Beltrán participated in neuromuscular re-education activities to improve: Balance, Proprioception and Posture for 00 minutes. The following activities were included:    NBOS on floor eyes open 2 x 30"  NBOS on floor eyes closed 2 x 30"  Tandem stance 2 x 30" " each  +marches with BUE>no UE hold, Min A when not using UE x 20 alt  +lateral steps over 5 cones with BUE>single UE x 3 laps, SBA    Antonia Beltrán participated in gait training to improve functional mobility and safety for 15 minutes, including:    +obstacle course between cones, stepping over cones, on airex, ascending/descending stairs x 3 laps  gait without AD x 120', CGA      Home Exercises Provided and Patient Education Provided     Education provided:   Cont to perform HEP as provided.     Written Home Exercises Provided: yes.  Exercises were reviewed and Antonia Beltrán was able to demonstrate them prior to the end of the session.  Antonia Beltrán demonstrated good  understanding of the education provided.     See EMR under Patient Instructions for exercises provided 2/4/2022.    Assessment     Antonia Beltrán arriving with FWW for confidence, no significant reliance for stability. Pt arrived fatigued due to lack of sleep last night, modified repetitions to accommodate pt fatigue. Addition of obstacle course today with gait belt donned for safety. Pt displays fair gait and balance when ambulating on firm, smooth surface with SBA. Use of Min-Mod A when stepping over objections due to fear of falling. Verbal cues for leg foot position when stepping over cones due to pt stepping with foot in inversion making it difficult to balance and progress back leg over cones. She demonstrated ability to ascend/descend 4 six inch steps with use of handrails and reciprocal gait. Continue to progress gait and balance training to improve independence.     Antonia Beltrán Is progressing well towards her goals.   Pt prognosis is Good.     Pt will continue to benefit from skilled outpatient physical therapy to address the deficits listed in the problem list box on initial evaluation, provide pt/family education and to maximize pt's level of independence in the home and community environment.     Pt's spiritual, cultural and educational needs considered and pt  agreeable to plan of care and goals.    Anticipated barriers to physical therapy: age    Goals: Short Term Goals:  4 weeks (progressing, not met)  1. Pt to demonstrate ascending stairs with reciprocal gait 4 steps x 5 without break to be able to ascend flight of stairs at home to enter master bedroom with single handrail and/or use of cane.   2. Increased hip MMT 1/2  to increase tolerance for ADL and work activities.  3. Pt to ambulate with use of single point cane or LRAD on variable surfaces without loss of balance for 400'.  4. Pt to tolerate HEP to improve ROM and independence with ADL's.  5. Pt to report no difficulty with light household activities.     Long Term Goals:  8 weeks  1.Pt to demonstrate ascending and descending stairs with reciprocal gait and use of single handrail to get to and from master bedroom independently.  2.Pt to demonstrate rotation of R hip equal to L to ambulate with decreased IR on R.  3.Increased hip MMT 1 grade  to increase tolerance for ADL and work activities.  4. Pt will ambulate without AD on variable surfaces without loss of balance to return to prior level of function.   5. Pt to demonstrate R hip flexion strength, plantar flexion strength, and glute strength of >/=4/5 to be able to return to driving tasks including pushing break and gas, and lifting leg between pedals without difficulty    Plan     Certification date: 7/6/2022.     Outpatient Physical Therapy 2 times weekly for 8 weeks to include the following interventions: Electrical Stimulation NMES, Gait Training, Manual Therapy, Moist Heat/ Ice, Neuromuscular Re-ed, Patient Education, Therapeutic Activities and Therapeutic Exercise.     Cont skilled PT session towards PT and patient's goals.    Cynthia Meier, PT   05/20/2022

## 2022-05-24 DIAGNOSIS — I73.9 IC (INTERMITTENT CLAUDICATION): ICD-10-CM

## 2022-05-24 RX ORDER — AMITRIPTYLINE HYDROCHLORIDE 50 MG/1
50 TABLET, FILM COATED ORAL NIGHTLY
Qty: 30 TABLET | Refills: 5 | Status: SHIPPED | OUTPATIENT
Start: 2022-05-24 | End: 2022-12-05

## 2022-05-27 ENCOUNTER — CLINICAL SUPPORT (OUTPATIENT)
Dept: REHABILITATION | Facility: HOSPITAL | Age: 87
End: 2022-05-27
Payer: MEDICARE

## 2022-05-27 DIAGNOSIS — Z74.09 IMPAIRED FUNCTIONAL MOBILITY, BALANCE, GAIT, AND ENDURANCE: ICD-10-CM

## 2022-05-27 DIAGNOSIS — R29.898 BILATERAL LEG WEAKNESS: Primary | ICD-10-CM

## 2022-05-27 PROCEDURE — 97110 THERAPEUTIC EXERCISES: CPT | Mod: PN

## 2022-05-27 PROCEDURE — 97116 GAIT TRAINING THERAPY: CPT | Mod: PN

## 2022-05-27 NOTE — PROGRESS NOTES
"  Physical Therapy Daily Treatment Note     Name: Antonia Maldonado  Clinic Number: [id number]    Therapy Diagnosis:   Encounter Diagnoses   Name Primary?    Bilateral leg weakness Yes    Impaired functional mobility, balance, gait, and endurance        Physician: Remigio Granda II, MD    Visit Date: 5/27/2022    Physician Orders: PT Eval and Treat   Medical Diagnosis from Referral: R27.0 (ICD-10-CM) - Ataxia  Evaluation Date: 1/19/2022 (reassessed 5/6/2022 after lapse in treatment)  Authorization Period Expiration: 12/31/2022  Plan of Care Expiration: 7/6/2022  Visit # / Visits authorized: 5/ 20+eval  PN Due: 5/29/2022     Time In: 1:04 pm  Time Out: 1:57 pm  Total Billable Time: 53 minutes    Precautions: Standard, Fall and hard of hearing, HTN, osteopenia    Subjective     Pt reports: She has been walking around her house with and without use of AD. Has not gone upstairs lately, as her bedroom was moved downstairs.     She was compliant with home exercise program.  Response to previous treatment: good  Functional change: too soon    Pain: 0/10  Location:  NA     Objective     BP: 120/56 pre-treatment  141/56 post treatment    Antonia Beltrán received therapeutic exercises to develop strength, endurance and flexibility for 30 minutes including:    Bridges x20  SLR 2x 10 on B  Hip abd with G loop 2x10 hold 3"  Pilates ring squeezes 15 x 3"  Clamshells x 10-min A on R    Sit to stand from elevated mat x 15 with UE-NP today  Standing hip abd x 10  Standing hip ext 2x 10  Standing hip flexion x 10    step up/down on bottom step x 10 each (forward)-NP  Squats in // bars with UE support 2x 10      Antonia Beltrán participated in neuromuscular re-education activities to improve: Balance, Proprioception and Posture for 00 minutes. The following activities were included:    NBOS on floor eyes open 2 x 30"  NBOS on floor eyes closed 2 x 30"  Tandem stance 2 x 30" each  marches with BUE>no UE hold, Min A when not using UE x 20 alt  lateral " steps over 5 cones with BUE>single UE x 3 laps, SBA    Antonia Beltrán participated in gait training to improve functional mobility and safety for 23 minutes, including:    obstacle course between cones, stepping over cones, on airex, ascending/descending stairs x 3 laps  gait without AD x 120', CGA  +stepping over 2 inch hurdles with CGA-Min A x 4 laps      Home Exercises Provided and Patient Education Provided     Education provided:   Cont to perform HEP as provided.     Written Home Exercises Provided: yes.  Exercises were reviewed and Antonia Beltrán was able to demonstrate them prior to the end of the session.  Antonia Beltrán demonstrated good  understanding of the education provided.     See EMR under Patient Instructions for exercises provided 2/4/2022.    Assessment     Antonia Beltrán arriving with FWW for confidence, no significant reliance for stability. She ambulates with slight internal rotation of R hip. Weakness remains in hips, R>L and needs min A for clamshells to achieve full ROM. She demonstrated ability to ascend/descend 4 six inch steps with use of handrails and reciprocal gait. Continue to progress gait and balance training to improve independence.     Antonia Beltrán Is progressing well towards her goals.   Pt prognosis is Good.     Pt will continue to benefit from skilled outpatient physical therapy to address the deficits listed in the problem list box on initial evaluation, provide pt/family education and to maximize pt's level of independence in the home and community environment.     Pt's spiritual, cultural and educational needs considered and pt agreeable to plan of care and goals.    Anticipated barriers to physical therapy: age    Goals: Short Term Goals:  4 weeks (progressing, not met)  1. Pt to demonstrate ascending stairs with reciprocal gait 4 steps x 5 without break to be able to ascend flight of stairs at home to enter master bedroom with single handrail and/or use of cane.   2. Increased hip MMT 1/2  to  increase tolerance for ADL and work activities.  3. Pt to ambulate with use of single point cane or LRAD on variable surfaces without loss of balance for 400'.  4. Pt to tolerate HEP to improve ROM and independence with ADL's.  5. Pt to report no difficulty with light household activities.     Long Term Goals:  8 weeks  1.Pt to demonstrate ascending and descending stairs with reciprocal gait and use of single handrail to get to and from master bedroom independently.  2.Pt to demonstrate rotation of R hip equal to L to ambulate with decreased IR on R.  3.Increased hip MMT 1 grade  to increase tolerance for ADL and work activities.  4. Pt will ambulate without AD on variable surfaces without loss of balance to return to prior level of function.   5. Pt to demonstrate R hip flexion strength, plantar flexion strength, and glute strength of >/=4/5 to be able to return to driving tasks including pushing break and gas, and lifting leg between pedals without difficulty    Plan     Certification date: 7/6/2022.     Outpatient Physical Therapy 2 times weekly for 8 weeks to include the following interventions: Electrical Stimulation NMES, Gait Training, Manual Therapy, Moist Heat/ Ice, Neuromuscular Re-ed, Patient Education, Therapeutic Activities and Therapeutic Exercise.     Cont skilled PT session towards PT and patient's goals.    Cynthia Meier, PT   05/27/2022

## 2022-06-03 ENCOUNTER — CLINICAL SUPPORT (OUTPATIENT)
Dept: REHABILITATION | Facility: HOSPITAL | Age: 87
End: 2022-06-03
Payer: MEDICARE

## 2022-06-03 DIAGNOSIS — R29.898 BILATERAL LEG WEAKNESS: Primary | ICD-10-CM

## 2022-06-03 DIAGNOSIS — Z74.09 IMPAIRED FUNCTIONAL MOBILITY, BALANCE, GAIT, AND ENDURANCE: ICD-10-CM

## 2022-06-03 PROCEDURE — 97116 GAIT TRAINING THERAPY: CPT | Mod: PN

## 2022-06-03 PROCEDURE — 97112 NEUROMUSCULAR REEDUCATION: CPT | Mod: PN

## 2022-06-03 PROCEDURE — 97110 THERAPEUTIC EXERCISES: CPT | Mod: PN

## 2022-06-03 NOTE — PROGRESS NOTES
"  Physical Therapy Daily Treatment Note     Name: Antonia Maldonado  Clinic Number: [id number]    Therapy Diagnosis:   Encounter Diagnoses   Name Primary?    Bilateral leg weakness Yes    Impaired functional mobility, balance, gait, and endurance        Physician: Remigio Granda II, MD    Visit Date: 6/3/2022    Physician Orders: PT Eval and Treat   Medical Diagnosis from Referral: R27.0 (ICD-10-CM) - Ataxia  Evaluation Date: 1/19/2022 (reassessed 5/6/2022 after lapse in treatment)  Authorization Period Expiration: 12/31/2022  Plan of Care Expiration: 7/6/2022  Visit # / Visits authorized: 5/ 20+eval  PN Due: 5/29/2022     Time In: 2:08 pm  Time Out: 3:02 pm  Total Billable Time: 54 minutes    Precautions: Standard, Fall and hard of hearing, HTN, osteopenia    Subjective     Pt reports: She has been walking around her house with and without use of AD. Feels her balance is improved but still cautious with activities.    She was compliant with home exercise program.  Response to previous treatment: good  Functional change: too soon    Pain: 0/10  Location:  NA     Objective       Antonia Beltrán received therapeutic exercises to develop strength, endurance and flexibility for 21 minutes including:    Bridges x30  SLR 2x 10 on B  Hip abd with G loop 2x10 hold 3"  Pilates ring squeezes 15 x 3"  Clamshells x 10-min A on R    Sit to stand from elevated mat x 15 with UE-NP today  Standing hip abd x 10  Standing hip ext 2x 10  Standing hip flexion x 10    step up/down on bottom step x 10 each (forward)-NP  Squats in // bars with UE support 2x 10      Antonia Beltrán participated in neuromuscular re-education activities to improve: Balance, Proprioception and Posture for 10 minutes. The following activities were included:    NBOS on floor eyes open 2 x 30"  NBOS on floor eyes closed 2 x 30"  Tandem stance 2 x 30" each  marches with BUE>no UE hold, Min A when not using UE x 20 alt    NP:  lateral steps over 5 cones with BUE>single UE x 3 laps, " SBA    Antonia Beltrán participated in gait training to improve functional mobility and safety for 23 minutes, including:    obstacle course between cones, stepping over cones, on airex, ascending/descending stairs x 3 laps  gait without AD x 120', CGA  stepping over 2 inch hurdles with CGA-Min A x 4 laps      Home Exercises Provided and Patient Education Provided     Education provided:   Cont to perform HEP as provided.     Written Home Exercises Provided: yes.  Exercises were reviewed and Antonia Beltrán was able to demonstrate them prior to the end of the session.  Antonia Beltrán demonstrated good  understanding of the education provided.     See EMR under Patient Instructions for exercises provided 2/4/2022.    Assessment     Antonia Beltrán arriving with FWW for confidence, no significant reliance for stability. She ambulates with slight internal rotation of R hip and appears cautious when stepping over and around obstacles. Weakness remains in hips, R>L and needs min A for clamshells to achieve full ROM. Continue to progress gait and balance training to improve independence.     Antonia Beltrán Is progressing well towards her goals.   Pt prognosis is Good.     Pt will continue to benefit from skilled outpatient physical therapy to address the deficits listed in the problem list box on initial evaluation, provide pt/family education and to maximize pt's level of independence in the home and community environment.     Pt's spiritual, cultural and educational needs considered and pt agreeable to plan of care and goals.    Anticipated barriers to physical therapy: age    Goals: Short Term Goals:  4 weeks (progressing, not met)  1. Pt to demonstrate ascending stairs with reciprocal gait 4 steps x 5 without break to be able to ascend flight of stairs at home to enter master bedroom with single handrail and/or use of cane.   2. Increased hip MMT 1/2  to increase tolerance for ADL and work activities.  3. Pt to ambulate with use of single point cane  or LRAD on variable surfaces without loss of balance for 400'.  4. Pt to tolerate HEP to improve ROM and independence with ADL's.  5. Pt to report no difficulty with light household activities.     Long Term Goals:  8 weeks  1.Pt to demonstrate ascending and descending stairs with reciprocal gait and use of single handrail to get to and from master bedroom independently.  2.Pt to demonstrate rotation of R hip equal to L to ambulate with decreased IR on R.  3.Increased hip MMT 1 grade  to increase tolerance for ADL and work activities.  4. Pt will ambulate without AD on variable surfaces without loss of balance to return to prior level of function.   5. Pt to demonstrate R hip flexion strength, plantar flexion strength, and glute strength of >/=4/5 to be able to return to driving tasks including pushing break and gas, and lifting leg between pedals without difficulty    Plan     Certification date: 7/6/2022.     Outpatient Physical Therapy 2 times weekly for 8 weeks to include the following interventions: Electrical Stimulation NMES, Gait Training, Manual Therapy, Moist Heat/ Ice, Neuromuscular Re-ed, Patient Education, Therapeutic Activities and Therapeutic Exercise.     Cont skilled PT session towards PT and patient's goals.    Cynthia Meier, PT   06/03/2022

## 2022-06-10 ENCOUNTER — CLINICAL SUPPORT (OUTPATIENT)
Dept: REHABILITATION | Facility: HOSPITAL | Age: 87
End: 2022-06-10
Payer: MEDICARE

## 2022-06-10 DIAGNOSIS — R29.898 BILATERAL LEG WEAKNESS: Primary | ICD-10-CM

## 2022-06-10 DIAGNOSIS — Z74.09 IMPAIRED FUNCTIONAL MOBILITY, BALANCE, GAIT, AND ENDURANCE: ICD-10-CM

## 2022-06-10 PROCEDURE — 97116 GAIT TRAINING THERAPY: CPT | Mod: PN,CQ

## 2022-06-10 PROCEDURE — 97112 NEUROMUSCULAR REEDUCATION: CPT | Mod: PN,CQ

## 2022-06-10 PROCEDURE — 97110 THERAPEUTIC EXERCISES: CPT | Mod: PN,CQ

## 2022-06-10 NOTE — PROGRESS NOTES
"  Physical Therapy Daily Treatment Note     Name: Antonia Maldonado  Clinic Number: [id number]    Therapy Diagnosis:   Encounter Diagnoses   Name Primary?    Bilateral leg weakness Yes    Impaired functional mobility, balance, gait, and endurance        Physician: Remigio Granda II, MD    Visit Date: 6/10/2022    Physician Orders: PT Eval and Treat   Medical Diagnosis from Referral: R27.0 (ICD-10-CM) - Ataxia  Evaluation Date: 1/19/2022 (reassessed 5/6/2022 after lapse in treatment)  Authorization Period Expiration: 12/31/2022  Plan of Care Expiration: 7/6/2022  Visit # / Visits authorized: 6/ 20+eval  PN Due: 5/29/2022     Time In: 11:10 am  Time Out: 12:10 pm  Total Billable Time: 60 minutes    Precautions: Standard, Fall and hard of hearing, HTN, osteopenia    Subjective     Pt reports: overall doing okay, having some soreness  She has been walking around her house with and without use of AD. Feels her balance is improved but still cautious with activities.    She was compliant with home exercise program.  Response to previous treatment: good  Functional change: too soon    Pain: 0/10  Location:  NA     Objective       Antonia Beltrán received therapeutic exercises to develop strength, endurance and flexibility for 30 minutes including:    Bridges x30, small rest at 15  SLR 2x 10 on B  Hip abd with G loop 2x10 hold 3"  Pilates ring squeezes 15 x 3"  Clamshells x 10-min A on R    Sit to stand from elevated mat x 15 with UE-NP today  Standing hip abd x 10  Standing hip ext 2x 10  Standing hip flexion x 10    step up/down on bottom step x 10 each (forward)  Squats in // bars with UE support 2x 10 - second set without UE assist      Antonia Beltrán participated in neuromuscular re-education activities to improve: Balance, Proprioception and Posture for 15 minutes. The following activities were included:    NBOS on floor eyes open 2 x 30"  NBOS on floor eyes closed 2 x 30"  Tandem stance 2 x 30" each  marches with BUE>no UE hold, Min A " when not using UE x 20 alt    NP:  lateral steps over 5 cones with BUE>single UE x 3 laps, SBA    Antonia Beltrán participated in gait training to improve functional mobility and safety for 15 minutes, including:    (NP) obstacle course between cones, stepping over cones, on airex, ascending/descending stairs x 3 laps  gait without AD x 120', CGA  stepping over 2 inch hurdles with CGA-Min A x 4 laps      Home Exercises Provided and Patient Education Provided     Education provided:   Cont to perform HEP as provided.     Written Home Exercises Provided: yes.  Exercises were reviewed and Antonia Beltrán was able to demonstrate them prior to the end of the session.  Antonia Beltrán demonstrated good  understanding of the education provided.     See EMR under Patient Instructions for exercises provided 2/4/2022.    Assessment     Patient tolerated progression of exercise well, obstacle course held today due to onset of fatigue. Increased focus placed today on strengthening exercises, right quad and hip abductor fatigue noted by end of session. Patient verbalizes plan to increase frequency of HEP at home to further build strength and endurance. Continue to progress gait and balance training to improve independence.     Antonia Beltrán Is progressing well towards her goals.   Pt prognosis is Good.     Pt will continue to benefit from skilled outpatient physical therapy to address the deficits listed in the problem list box on initial evaluation, provide pt/family education and to maximize pt's level of independence in the home and community environment.     Pt's spiritual, cultural and educational needs considered and pt agreeable to plan of care and goals.    Anticipated barriers to physical therapy: age    Goals: Short Term Goals:  4 weeks (progressing, not met)  1. Pt to demonstrate ascending stairs with reciprocal gait 4 steps x 5 without break to be able to ascend flight of stairs at home to enter master bedroom with single handrail and/or use  of cane.   2. Increased hip MMT 1/2  to increase tolerance for ADL and work activities.  3. Pt to ambulate with use of single point cane or LRAD on variable surfaces without loss of balance for 400'.  4. Pt to tolerate HEP to improve ROM and independence with ADL's.  5. Pt to report no difficulty with light household activities.     Long Term Goals:  8 weeks  1.Pt to demonstrate ascending and descending stairs with reciprocal gait and use of single handrail to get to and from master bedroom independently.  2.Pt to demonstrate rotation of R hip equal to L to ambulate with decreased IR on R.  3.Increased hip MMT 1 grade  to increase tolerance for ADL and work activities.  4. Pt will ambulate without AD on variable surfaces without loss of balance to return to prior level of function.   5. Pt to demonstrate R hip flexion strength, plantar flexion strength, and glute strength of >/=4/5 to be able to return to driving tasks including pushing break and gas, and lifting leg between pedals without difficulty    Plan     Certification date: 7/6/2022.     Outpatient Physical Therapy 2 times weekly for 8 weeks to include the following interventions: Electrical Stimulation NMES, Gait Training, Manual Therapy, Moist Heat/ Ice, Neuromuscular Re-ed, Patient Education, Therapeutic Activities and Therapeutic Exercise.     Cont skilled PT session towards PT and patient's goals.    Maureen Lambert, PTA   06/10/2022

## 2022-06-16 ENCOUNTER — OFFICE VISIT (OUTPATIENT)
Dept: HEMATOLOGY/ONCOLOGY | Facility: CLINIC | Age: 87
End: 2022-06-16
Payer: MEDICARE

## 2022-06-16 ENCOUNTER — TELEPHONE (OUTPATIENT)
Dept: HEMATOLOGY/ONCOLOGY | Facility: CLINIC | Age: 87
End: 2022-06-16
Payer: MEDICARE

## 2022-06-16 VITALS
HEART RATE: 86 BPM | HEIGHT: 62 IN | WEIGHT: 123.88 LBS | OXYGEN SATURATION: 98 % | DIASTOLIC BLOOD PRESSURE: 65 MMHG | SYSTOLIC BLOOD PRESSURE: 138 MMHG | BODY MASS INDEX: 22.8 KG/M2 | RESPIRATION RATE: 16 BRPM | TEMPERATURE: 98 F

## 2022-06-16 DIAGNOSIS — Z90.12 HISTORY OF MODIFIED RADICAL MASTECTOMY OF LEFT BREAST: Primary | ICD-10-CM

## 2022-06-16 DIAGNOSIS — C43.62 MALIGNANT MELANOMA OF LEFT FOREARM: ICD-10-CM

## 2022-06-16 PROCEDURE — 99213 OFFICE O/P EST LOW 20 MIN: CPT | Mod: S$PBB,,, | Performed by: SURGERY

## 2022-06-16 PROCEDURE — 99999 PR PBB SHADOW E&M-EST. PATIENT-LVL IV: CPT | Mod: PBBFAC,,, | Performed by: SURGERY

## 2022-06-16 PROCEDURE — 99213 PR OFFICE/OUTPT VISIT, EST, LEVL III, 20-29 MIN: ICD-10-PCS | Mod: S$PBB,,, | Performed by: SURGERY

## 2022-06-16 PROCEDURE — 99214 OFFICE O/P EST MOD 30 MIN: CPT | Mod: PBBFAC,PN | Performed by: SURGERY

## 2022-06-16 PROCEDURE — 99999 PR PBB SHADOW E&M-EST. PATIENT-LVL IV: ICD-10-PCS | Mod: PBBFAC,,, | Performed by: SURGERY

## 2022-06-16 NOTE — NURSING
Patient seen by Dr. Mejia @ [facility].  Patient is scheduled for 3 month follow up (9/15).  Patient aware.

## 2022-06-16 NOTE — PROGRESS NOTES
SUBJECTIVE:     History of Present Illness:  Very pleasant 89-year-old female referred today with newly diagnosed left forearm melanoma.  Patient with complex medical history including recent hip fracture of which she required inpatient rehabilitation.  Fortunately she has recovered nicely from this.  During this process she began to notice changes to a left forearm skin lesion which had been present for several years.  Initially it was a small raised lesion in during the time of her hip fracture a became larger with noted bleeding and subsequent changes.  She was subsequently seen by Dr. Vielka Thompson whom performed shave biopsy on 02/01/2022.  Pathology consistent with malignant melanoma Breslow depth at least 2.7 mm with deep and lateral margins positive.  Pathology report describes no evidence of ulceration despite history of bleeding.  Jose Roberto level 4 with 2 mitoses present.  Pathologic stage at least a T3 a lesion.  Past medical history also includes a locally aggressive left breast cancer status post left modified radical mastectomy in 2004.  The patient underwent complete axillary lymph node dissection with 20 to of 23 lymph nodes involved with metastatic carcinoma.  Patient received adjuvant chemotherapy and radiation and continues to ZOHREH.  Patient established with an continues to follow w/ Dr. Ortiz.    3/30/22  Status post wide local excision left forearm malignant melanoma on 03/17/2022.  Given the complexity of her clinical history and sentinel lymph node mapping we elected to avoid attempting sentinel node biopsy.  Patient reports she is doing very well since surgery.  No pain or discomfort in her arm.  No drainage or wound concerns.  Pathology report as pasted below previously discussed with patient's daughter.    Pathology:SKIN, LEFT ARM, EXCISION   - NEGATIVE FOR RESIDUAL NEOPLASM   - INCIDENTAL SQUAMOUS CELL CARCINOMA IN SITU, MARGINS NEGATIVE   - INCIDENTAL SEBORRHEIC KERATOSIS   - PRIOR BIOPSY  SITE CHANGES PRESENT    6/16/22  Doing well. No concerns from her standpoint.  No headaches or new bony pains.  Scheduled to f/u w/ Dr. Thompson in the next few weeks.      Chief Complaint   Patient presents with    Malignant melanoma of left forearm       Review of patient's allergies indicates:   Allergen Reactions    Opioids - morphine analogues Hallucinations       Current Outpatient Medications   Medication Sig Dispense Refill    acetaminophen (TYLENOL) 325 MG tablet Take 650 mg by mouth every 6 (six) hours as needed for Pain or Temperature greater than.      alendronate (FOSAMAX) 70 MG tablet Take 1 tablet (70 mg total) by mouth every 7 days. 4 tablet 11    amitriptyline (ELAVIL) 50 MG tablet Take 1 tablet (50 mg total) by mouth every evening. 30 tablet 5    amLODIPine (NORVASC) 5 MG tablet TAKE 1 TABLET BY MOUTH DAILY (Patient taking differently: Take 5 mg by mouth once daily.) 90 tablet 0    aspirin-acetaminophen-caffeine 250-250-65 mg (EXCEDRIN EXTRA STRENGTH) 250-250-65 mg per tablet Take 2 tablets by mouth Daily.      docusate sodium (COLACE) 100 MG capsule Take 1 capsule (100 mg total) by mouth 2 (two) times daily. (Patient taking differently: Take 100 mg by mouth Daily.)  0    ELMIRON 100 mg Cap TAKE 1 CAPSULE(100 MG) BY MOUTH THREE TIMES DAILY (Patient taking differently: Take 100 mg by mouth every evening.) 90 capsule 11    irbesartan (AVAPRO) 150 MG tablet TAKE 1 TABLET BY MOUTH EVERY DAY (Patient taking differently: Take 150 mg by mouth once daily.) 90 tablet 0    levothyroxine (SYNTHROID) 150 MCG tablet TAKE 1 TABLET(150 MCG) BY MOUTH EVERY DAY (Patient taking differently: Take 150 mcg by mouth before breakfast.) 30 tablet 6    magnesium hydroxide 400 mg/5 ml (MILK OF MAGNESIA) 400 mg/5 mL Susp Take 30 mLs by mouth every 3 (three) hours as needed (constipation). May give 30ml every 3 hours while awake until has BM, if has not had BM in 72 hrs      phenyleph/mineral oil/petrolat  (PREPARATION H RECT) Place 1 application rectally daily as needed (Hemorrhoids).      polyethylene glycol (GLYCOLAX) 17 gram PwPk Take 17 g by mouth daily as needed (constipation).      pravastatin (PRAVACHOL) 20 MG tablet TAKE 1 TABLET(20 MG) BY MOUTH EVERY DAY (Patient taking differently: Take 20 mg by mouth every evening.) 90 tablet 0    traMADoL (ULTRAM) 50 mg tablet Take 1 tablet (50 mg total) by mouth every 6 (six) hours as needed for Pain. 15 tablet 0    xylitoL (XYLIMELTS) 550 mg MaBT Place 1 tablet inside cheek every 8 (eight) hours as needed (dry mouth). Place and Dissolve 1 tab in mouth in cheek      zolpidem (AMBIEN) 5 MG Tab TAKE 1 TABLET(5 MG) BY MOUTH EVERY NIGHT AS NEEDED 30 tablet 1    hydrOXYzine (ATARAX) 50 MG tablet Take 1 tablet (50 mg total) by mouth 2 (two) times daily as needed for Itching or Anxiety. (Patient not taking: No sig reported) 60 tablet 5     No current facility-administered medications for this visit.     Facility-Administered Medications Ordered in Other Visits   Medication Dose Route Frequency Provider Last Rate Last Admin    HYDROmorphone injection 0.2 mg  0.2 mg Intravenous Q5 Min PRN Latasha Pruitt MD        lorazepam injection 0.25 mg  0.25 mg Intravenous Once PRN Latasha Pruitt MD        ondansetron injection 4 mg  4 mg Intravenous Daily PRN Latasha Pruitt MD        oxyCODONE-acetaminophen 5-325 mg per tablet 1 tablet  1 tablet Oral Q3H PRN Latasha Pruitt MD           Past Medical History:   Diagnosis Date    Adenocarcinoma, breast     Anemia     Anxiety and depression     Breast cancer     Constipation     Depression     Hearing loss     History of recurrent UTIs     Hypertension     Hypothyroidism     IBS (irritable bowel syndrome)     Interstitial cystitis     Sleep apnea      Past Surgical History:   Procedure Laterality Date    APPENDECTOMY      bladder lift      EXCISION OF MELANOMA Left 3/17/2022    Procedure: EXCISION, MELANOMA  "- Arm;  Surgeon: Travis Mejia MD;  Location: [facility] OR;  Service: General;  Laterality: Left;    GANGLION CYST EXCISION      HEMIARTHROPLASTY OF HIP Right 2021    Procedure: HEMIARTHROPLASTY, HIP;  Surgeon: Chau Garibay MD;  Location: [facility] OR;  Service: Orthopedics;  Laterality: Right;    HYSTERECTOMY      MASTECTOMY Left     TONSILLECTOMY       Family History   Problem Relation Age of Onset    Cancer Father         rectal    Cancer Brother         mesothyleoma     Social History     Tobacco Use    Smoking status: Former Smoker     Types: Cigarettes     Quit date:      Years since quittin.4    Smokeless tobacco: Never Used   Substance Use Topics    Alcohol use: Yes     Comment: occasionally        Review of Systems:  Review of Systems   Constitutional: Negative for appetite change, chills and fever.   Respiratory: Negative for cough, chest tightness and shortness of breath.    Cardiovascular: Negative for chest pain and palpitations.   Gastrointestinal: Negative for abdominal pain.   Neurological: Negative for headaches.   Hematological: Negative for adenopathy. Does not bruise/bleed easily.       OBJECTIVE:     Vital Signs (Most Recent)  Temp: 98 °F (36.7 °C) (22 1343)  Pulse: 86 (22 1343)  Resp: 16 (22 1343)  BP: 138/65 (22 1343)  SpO2: 98 % (22 1343)  5' 2" (1.575 m)  56.2 kg (123 lb 14.4 oz)     Physical Exam:  Physical Exam  Constitutional:       General: She is not in acute distress.     Appearance: She is not ill-appearing or toxic-appearing.      Comments: Ambulates with walker   HENT:      Head: Normocephalic and atraumatic.   Cardiovascular:      Rate and Rhythm: Normal rate.      Pulses: Normal pulses.   Pulmonary:      Effort: Pulmonary effort is normal. No respiratory distress.      Breath sounds: No wheezing.   Chest:   Breasts:      Right: No axillary adenopathy or supraclavicular adenopathy.      Left: No axillary adenopathy or " supraclavicular adenopathy.       Lymphadenopathy:      Cervical:      Right cervical: No superficial, deep or posterior cervical adenopathy.     Left cervical: No superficial, deep or posterior cervical adenopathy.      Upper Body:      Right upper body: No supraclavicular, axillary or pectoral adenopathy.      Left upper body: No supraclavicular, axillary or pectoral adenopathy.      Comments: Clear evidence of left axillary lymph node dissection with paucity of soft tissue present.  No palpable mass or LN   Skin:            Comments: WLE excision site well healed.  No palpable lesions or sub-q nodules.  No evidence of in-transit lesions.   Neurological:      General: No focal deficit present.      Mental Status: She is alert and oriented to person, place, and time.           Diagnostic Results:  As discussed in HPI    Assessment:       1. History of modified radical mastectomy of left breast    2. Malignant melanoma of left forearm        Plan:       Patient doing well following wide local excision.  Plan for q3 month surveillance of likely the first year again discussed.  Continue follow up with derm and med oncology.

## 2022-06-17 ENCOUNTER — CLINICAL SUPPORT (OUTPATIENT)
Dept: REHABILITATION | Facility: HOSPITAL | Age: 87
End: 2022-06-17
Payer: MEDICARE

## 2022-06-17 DIAGNOSIS — Z74.09 IMPAIRED FUNCTIONAL MOBILITY, BALANCE, GAIT, AND ENDURANCE: ICD-10-CM

## 2022-06-17 DIAGNOSIS — R29.898 BILATERAL LEG WEAKNESS: Primary | ICD-10-CM

## 2022-06-17 PROCEDURE — 97112 NEUROMUSCULAR REEDUCATION: CPT | Mod: PN

## 2022-06-17 PROCEDURE — 97116 GAIT TRAINING THERAPY: CPT | Mod: PN

## 2022-06-17 PROCEDURE — 97110 THERAPEUTIC EXERCISES: CPT | Mod: PN

## 2022-06-17 NOTE — PROGRESS NOTES
"  Physical Therapy Daily Treatment Note     Name: Antonia Maldonado  Clinic Number: [id number]    Therapy Diagnosis:   Encounter Diagnoses   Name Primary?    Bilateral leg weakness Yes    Impaired functional mobility, balance, gait, and endurance          Physician: Remigio Granda II, MD    Visit Date: 2022    Physician Orders: PT Eval and Treat   Medical Diagnosis from Referral: R27.0 (ICD-10-CM) - Ataxia  Evaluation Date: 2022 (reassessed 2022 after lapse in treatment)  Authorization Period Expiration: 2022  Plan of Care Expiration: 2022  Visit # / Visits authorized: +eval  PN Due: 2022     Time In: 1:04 pm  Time Out: 2:03 pm  Total Billable Time: 59 minutes    Precautions: Standard, Fall and hard of hearing, HTN, osteopenia    Subjective     Pt reports: Doing well, still cautious when walking without AD.    She was compliant with home exercise program.  Response to previous treatment: good  Functional change: too soon    Pain: 0/10  Location:  NA     Objective     Hip Range of Motion:    Left active Right active    Flexion 120 deg 115 deg   Abduction 40 40   Extension 6 3      Lower Extremity Strength    RLE LLE   Hip Flexion: 4-/5 4-/5   Hip Extension:  3+/5 4-/5   Hip Abduction: 3/5 4-/5   Hip Adduction: 3+/5 3+/5   Knee Extension: 5/5 5/5   Knee Flexion: 3+/5 4+/5   Ankle Dorsiflexion: 4/5 4/5   Ankle Plantarflexion: 4+/5 4+/5         TU sec without FWW  5x sit to stand: 11 seconds with UE support  Feet together, eyes open: 30 seconds, min sway  Feet together, eyes closed: 21 seconds, mod sway    Antonia Beltrán received therapeutic exercises to develop strength, endurance and flexibility for 29 minutes including:    Bridges x30, small rest at 15  SLR 2x 10 on B  Hip abd with G loop 2x10 hold 3"  Pilates ring squeezes 15 x 3"  Clamshells x 10-min A on R    Sit to stand from elevated mat x 15 with UE-NP today  Standing hip abd x 10  Standing hip ext 2x 10  Standing hip flexion x " "10    step up/down on bottom step x 10 each (forward)  Squats in // bars with UE support 2x 10 - second set without UE assist      Antonia Beltrán participated in neuromuscular re-education activities to improve: Balance, Proprioception and Posture for 20 minutes. The following activities were included:    NBOS on floor eyes open 2 x 30"  NBOS on floor eyes closed 2 x 30"  Tandem stance 2 x 30" each  marches with BUE>no UE hold, Min A when not using UE x 20 alt    NP:  lateral steps over 5 cones with BUE>single UE x 3 laps, SBA    Antonia Beltrán participated in gait training to improve functional mobility and safety for 10 minutes, including:    (NP) obstacle course between cones, stepping over cones, on airex, ascending/descending stairs x 3 laps  gait without AD x 120', CGA  stepping over 2 inch hurdles with CGA-Min A x 4 laps      Home Exercises Provided and Patient Education Provided     Education provided:   Cont to perform HEP as provided.     Written Home Exercises Provided: yes.  Exercises were reviewed and Antonia Beltrán was able to demonstrate them prior to the end of the session.  Antonia Beltrán demonstrated good  understanding of the education provided.     See EMR under Patient Instructions for exercises provided 2/4/2022.    Assessment     Pt was reassessed by PT today. She ambulates intermittently at home without AD. When going out into community use of FWW for balance. Slight IR of R hip with ambulation due to weakness of hip ER and compensatory pattern for impaired balance. She displays gross R hip strength of 3+/5(improved from 2/5 at initial evaluation). Timed Up and Go performed in 11 seconds without use of AD and SBA.Mild unsteadiness with vision eliminated balance testing and narrowed base of support. Pt continues to be apprehensive when ambulating without AD. She would benefit from continued physical therapy to progress gait and balance training to improve independence.     Antonia Beltrán Is progressing well towards her " goals.   Pt prognosis is Good.     Pt will continue to benefit from skilled outpatient physical therapy to address the deficits listed in the problem list box on initial evaluation, provide pt/family education and to maximize pt's level of independence in the home and community environment.     Pt's spiritual, cultural and educational needs considered and pt agreeable to plan of care and goals.    Anticipated barriers to physical therapy: age    Goals: Short Term Goals:  4 weeks (progressing, not met)  1. Pt to demonstrate ascending stairs with reciprocal gait 4 steps x 5 without break to be able to ascend flight of stairs at home to enter master bedroom with single handrail and/or use of cane.   2. Increased hip MMT 1/2  to increase tolerance for ADL and work activities.  3. Pt to ambulate with use of single point cane or LRAD on variable surfaces without loss of balance for 400'.  4. Pt to tolerate HEP to improve ROM and independence with ADL's.  5. Pt to report no difficulty with light household activities.     Long Term Goals:  8 weeks  1.Pt to demonstrate ascending and descending stairs with reciprocal gait and use of single handrail to get to and from master bedroom independently.  2.Pt to demonstrate rotation of R hip equal to L to ambulate with decreased IR on R.  3.Increased hip MMT 1 grade  to increase tolerance for ADL and work activities.  4. Pt will ambulate without AD on variable surfaces without loss of balance to return to prior level of function.   5. Pt to demonstrate R hip flexion strength, plantar flexion strength, and glute strength of >/=4/5 to be able to return to driving tasks including pushing break and gas, and lifting leg between pedals without difficulty    Plan     Certification date: 7/6/2022.     Outpatient Physical Therapy 2 times weekly for 8 weeks to include the following interventions: Electrical Stimulation NMES, Gait Training, Manual Therapy, Moist Heat/ Ice, Neuromuscular Re-ed,  Patient Education, Therapeutic Activities and Therapeutic Exercise.     Cont skilled PT session towards PT and patient's goals.    Cynthia Meier, PT   06/17/2022

## 2022-06-24 ENCOUNTER — CLINICAL SUPPORT (OUTPATIENT)
Dept: REHABILITATION | Facility: HOSPITAL | Age: 87
End: 2022-06-24
Payer: MEDICARE

## 2022-06-24 DIAGNOSIS — R29.898 BILATERAL LEG WEAKNESS: Primary | ICD-10-CM

## 2022-06-24 DIAGNOSIS — Z74.09 IMPAIRED FUNCTIONAL MOBILITY, BALANCE, GAIT, AND ENDURANCE: ICD-10-CM

## 2022-06-24 PROCEDURE — 97110 THERAPEUTIC EXERCISES: CPT | Mod: PN

## 2022-06-24 PROCEDURE — 97116 GAIT TRAINING THERAPY: CPT | Mod: PN

## 2022-06-24 NOTE — PROGRESS NOTES
"  Physical Therapy Daily Treatment Note     Name: Antonia Maldonado  Clinic Number: [id number]    Therapy Diagnosis:   Encounter Diagnoses   Name Primary?    Bilateral leg weakness Yes    Impaired functional mobility, balance, gait, and endurance        Physician: Remigio Granda II, MD    Visit Date: 2022    Physician Orders: PT Eval and Treat   Medical Diagnosis from Referral: R27.0 (ICD-10-CM) - Ataxia  Evaluation Date: 2022 (reassessed 2022 after lapse in treatment)  Authorization Period Expiration: 2022  Plan of Care Expiration: 2022  Visit # / Visits authorized: +eval  PN Due: 2022     Time In: 1:07 pm  Time Out: 2:03 pm  Total Billable Time: 56 minutes    Precautions: Standard, Fall and hard of hearing, HTN, osteopenia    Subjective     Pt reports: Doing well, still cautious when walking without AD.    She was compliant with home exercise program.  Response to previous treatment: good  Functional change: too soon    Pain: 0/10  Location:  NA     Objective     Hip Range of Motion:    Left active Right active    Flexion 120 deg 115 deg   Abduction 40 40   Extension 6 3      Lower Extremity Strength    RLE LLE   Hip Flexion: 4-/5 4-/5   Hip Extension:  3+/5 4-/5   Hip Abduction: 3/5 4-/5   Hip Adduction: 3+/5 3+/5   Knee Extension: 5/5 5/5   Knee Flexion: 3+/5 4+/5   Ankle Dorsiflexion: 4/5 4/5   Ankle Plantarflexion: 4+/5 4+/5         TU sec without FWW  5x sit to stand: 11 seconds with UE support  Feet together, eyes open: 30 seconds, min sway  Feet together, eyes closed: 21 seconds, mod sway      BP at start of treatment: 115/50 in sitting, after 5 minutes 105/50. Then pt was laid in supine with feet elevated.  End of treatment: 129/61    Antonia Beltrán received therapeutic exercises to develop strength, endurance and flexibility for 36 minutes including:    Bridges x30, small rest at 15  SLR 2x 10 on B  Hip abd with G loop 2x10 hold 3"  Pilates ring squeezes 15 x 3"  Clamshells x " "10-min A on R    Sit to stand from elevated mat x 15 with UE-NP today  Standing hip abd x 10  Standing hip ext 2x 10  Standing hip flexion x 10    step up/down on bottom step x 10 each (forward)  Squats in // bars with UE support 2x 10 - second set without UE assist      Antonia Beltrán participated in neuromuscular re-education activities to improve: Balance, Proprioception and Posture for 00 minutes. The following activities were included:    NBOS on floor eyes open 2 x 30"  NBOS on floor eyes closed 2 x 30"  Tandem stance 2 x 30" each  marches with BUE>no UE hold, Min A when not using UE x 20 alt    NP:  lateral steps over 5 cones with BUE>single UE x 3 laps, SBA    Antonia Beltrán participated in gait training to improve functional mobility and safety for 20 minutes, including:    (NP) obstacle course between cones, stepping over cones, on airex, ascending/descending stairs x 3 laps  gait without AD including ascending/ descending stairs with reciprocal steps x 220', CGA  stepping over 2 inch hurdles with CGA-Min A x 4 laps  +high march gait x 2 laps with CGA-Min A      Home Exercises Provided and Patient Education Provided     Education provided:   Cont to perform HEP as provided.     Written Home Exercises Provided: yes.  Exercises were reviewed and Antonia Beltrán was able to demonstrate them prior to the end of the session.  Antonia Beltrán demonstrated good  understanding of the education provided.     See EMR under Patient Instructions for exercises provided 2/4/2022.    Assessment       Pt continues to have a fear of falling when ambulating in clinic without AD. Improvement in hip IR in standing today. Continued hip and LE strengthening to improve transfers and gait. Pt reported lightheadedness when transitioning supine to sit. BP was taken in sitting at 115/50 and again after 5 minutes to 105/50. PT laid pt in supine with legs elevated to increase BP. Continued treatment with increase in BP. BP taken at completion of treatment at " 129/61.  Pt was reassessed by PT today. She ambulates intermittently at home without AD. When going out into community use of FWW for balance. Slight IR of R hip with ambulation due to weakness of hip ER and compensatory pattern for impaired balance. She displays gross R hip strength of 3+/5(improved from 2/5 at initial evaluation). Timed Up and Go performed in 11 seconds without use of AD and SBA.Mild unsteadiness with vision eliminated balance testing and narrowed base of support. Pt continues to be apprehensive when ambulating without AD. She would benefit from continued physical therapy to progress gait and balance training to improve independence.     Antonia Beltrán Is progressing well towards her goals.   Pt prognosis is Good.     Pt will continue to benefit from skilled outpatient physical therapy to address the deficits listed in the problem list box on initial evaluation, provide pt/family education and to maximize pt's level of independence in the home and community environment.     Pt's spiritual, cultural and educational needs considered and pt agreeable to plan of care and goals.    Anticipated barriers to physical therapy: age    Goals: Short Term Goals:  4 weeks (progressing, not met)  1. Pt to demonstrate ascending stairs with reciprocal gait 4 steps x 5 without break to be able to ascend flight of stairs at home to enter master bedroom with single handrail and/or use of cane.   2. Increased hip MMT 1/2  to increase tolerance for ADL and work activities.  3. Pt to ambulate with use of single point cane or LRAD on variable surfaces without loss of balance for 400'.  4. Pt to tolerate HEP to improve ROM and independence with ADL's.  5. Pt to report no difficulty with light household activities.     Long Term Goals:  8 weeks  1.Pt to demonstrate ascending and descending stairs with reciprocal gait and use of single handrail to get to and from master bedroom independently.  2.Pt to demonstrate rotation of R  hip equal to L to ambulate with decreased IR on R.  3.Increased hip MMT 1 grade  to increase tolerance for ADL and work activities.  4. Pt will ambulate without AD on variable surfaces without loss of balance to return to prior level of function.   5. Pt to demonstrate R hip flexion strength, plantar flexion strength, and glute strength of >/=4/5 to be able to return to driving tasks including pushing break and gas, and lifting leg between pedals without difficulty    Plan     Certification date: 7/6/2022.     Outpatient Physical Therapy 2 times weekly for 8 weeks to include the following interventions: Electrical Stimulation NMES, Gait Training, Manual Therapy, Moist Heat/ Ice, Neuromuscular Re-ed, Patient Education, Therapeutic Activities and Therapeutic Exercise.     Cont skilled PT session towards PT and patient's goals.    Cynthia Meier, PT   06/24/2022

## 2022-07-01 ENCOUNTER — CLINICAL SUPPORT (OUTPATIENT)
Dept: REHABILITATION | Facility: HOSPITAL | Age: 87
End: 2022-07-01
Payer: MEDICARE

## 2022-07-01 DIAGNOSIS — R27.0 ATAXIA: Primary | ICD-10-CM

## 2022-07-01 DIAGNOSIS — Z74.09 IMPAIRED FUNCTIONAL MOBILITY, BALANCE, GAIT, AND ENDURANCE: ICD-10-CM

## 2022-07-01 DIAGNOSIS — R29.898 BILATERAL LEG WEAKNESS: ICD-10-CM

## 2022-07-01 PROCEDURE — 97110 THERAPEUTIC EXERCISES: CPT | Mod: PN

## 2022-07-01 PROCEDURE — 97112 NEUROMUSCULAR REEDUCATION: CPT | Mod: PN

## 2022-07-01 PROCEDURE — 97116 GAIT TRAINING THERAPY: CPT | Mod: PN

## 2022-07-01 NOTE — PROGRESS NOTES
[facility]   Physical Therapy Treatment Note     Name: Antonia Maldonado  Clinic Number: [id number]    Therapy Diagnosis:   Encounter Diagnoses   Name Primary?    Ataxia Yes    Bilateral leg weakness     Impaired functional mobility, balance, gait, and endurance      Physician: Remigio Granda II, MD    Visit Date: 7/1/2022    Physician Orders: PT Eval and Treat   Medical Diagnosis from Referral: R27.0 (ICD-10-CM) - Ataxia  Evaluation Date: 1/19/2022 (reassessed 5/6/2022 after lapse in treatment)  Authorization Period Expiration: 12/31/2022  Plan of Care Expiration: 7/6/2022  Visit # / Visits authorized: 10/ 20                                    Time In: 1313 (1313 check in time for 1300 appointment)  Time Out: 1400  Total Billable Time: 47 minutes     Precautions: Fall and hard of hearing, HTN, osteopenia      SUBJECTIVE     Pt reports: recent episode of bilateral groin pain after performing standing hip abduction at her kitchen counter - has since subsided.  She was compliant with home exercise program.  Response to previous treatment: no changes  Functional change: none    Pain: no complaints of pain      OBJECTIVE     Objective Measures updated at progress report unless specified.     Treatment     Antonia Beltrán received the treatments listed below:      therapeutic exercises to develop strength and endurance for 21 minutes including:     X 20 each seated bilateral lower extremity therapeutic exercise (2#) = marching, long arc quad, hip abduction (red theraband), ball squeeze    X 10 sit to stand with hands on thighs emphasizing proper technique   X 20 each shuttle mini squats and heel raises (37#)   X 5 minutes bike (level 1) as an adjunct to strength/balance/mobility deficits      neuromuscular re-education activities to improve: Balance and Proprioception for 18 minutes. The following activities were included:     X 1 minute stand on [employer]Ex   X 15 standing mini squats on AirEx   X 15  alternating toe taps on 3-inch stool   2 x 15 feet each balance training with stand by assist/ contact guard assist = side stepping, backwards gait   X 10 stepping over 4-inch obstacle      gait training to improve functional mobility and safety for 8 minutes, including:     Up/down 2 x 4 (6-inch) steps with stand by assist and reciprocating gait pattern   Functional ambulation 50 feet with contact guard assist/min assist emphasizing heel strike contact in terminal swing      Patient Education and Home Exercises     Home Exercises Provided and Patient Education Provided     Education provided:   - proper therapeutic exercise technique  - continue home exercise program     Written Home Exercises Provided: Patient instructed to cont prior HEP.       ASSESSMENT     Patient needed contact guard assist/ minimal assist during balance training; minimal right upper discomfort reported during stair training; reports some difficulty with turning; otherwise, able to tolerate treatment session with minimal difficulty.    Antonia Beltrán Is progressing fairly well towards her goals.   Pt prognosis is Fair.     Pt will continue to benefit from skilled outpatient physical therapy to address the deficits listed in the problem list box on initial evaluation, provide pt/family education and to maximize pt's level of independence in the home and community environment.     Pt's spiritual, cultural and educational needs considered and pt agreeable to plan of care and goals.     Anticipated barriers to physical therapy: severity of pain and instability    Goals:     Short Term Goals:  4 weeks   1. Pt to demonstrate ascending stairs with reciprocal gait 4 steps x 5 without break to be able to ascend flight of stairs at home to enter master bedroom with single handrail and/or use of cane. (PART MET)  2. Increased hip MMT 1/2  to increase tolerance for ADL and work activities. (NOT MET)  3. Pt to ambulate with use of single point cane or LRAD on  variable surfaces without loss of balance for 400'. (NOT MET)  4. Pt to tolerate HEP to improve ROM and independence with ADL's. (PART MET)  5. Pt to report no difficulty with light household activities. (NOT MET)     Long Term Goals:  8 weeks  1.Pt to demonstrate ascending and descending stairs with reciprocal gait and use of single handrail to get to and from master bedroom independently. (PART MET)  2.Pt to demonstrate rotation of R hip equal to L to ambulate with decreased IR on R. (NOT MET)  3.Increased hip MMT 1 grade  to increase tolerance for ADL and work activities. (NOT MET)  4. Pt will ambulate without AD on variable surfaces without loss of balance to return to prior level of function. (NOT MET)  5. Pt to demonstrate R hip flexion strength, plantar flexion strength, and glute strength of >/=4/5 to be able to return to driving tasks including pushing break and gas, and lifting leg between pedals without difficulty (NOT MET)      PLAN     Continue to progress strength/balance/mobility training to patient's tolerance.      Ger Ly, PT

## 2022-07-08 ENCOUNTER — OFFICE VISIT (OUTPATIENT)
Dept: URGENT CARE | Facility: CLINIC | Age: 87
End: 2022-07-08
Payer: MEDICARE

## 2022-07-08 ENCOUNTER — CLINICAL SUPPORT (OUTPATIENT)
Dept: REHABILITATION | Facility: HOSPITAL | Age: 87
End: 2022-07-08
Payer: MEDICARE

## 2022-07-08 VITALS
RESPIRATION RATE: 16 BRPM | OXYGEN SATURATION: 97 % | SYSTOLIC BLOOD PRESSURE: 120 MMHG | TEMPERATURE: 98 F | HEART RATE: 80 BPM | WEIGHT: 123 LBS | HEIGHT: 62 IN | BODY MASS INDEX: 22.63 KG/M2 | DIASTOLIC BLOOD PRESSURE: 57 MMHG

## 2022-07-08 DIAGNOSIS — R29.898 BILATERAL LEG WEAKNESS: Primary | ICD-10-CM

## 2022-07-08 DIAGNOSIS — H10.9 CONJUNCTIVITIS OF BOTH EYES, UNSPECIFIED CONJUNCTIVITIS TYPE: Primary | ICD-10-CM

## 2022-07-08 DIAGNOSIS — Z74.09 IMPAIRED FUNCTIONAL MOBILITY, BALANCE, GAIT, AND ENDURANCE: ICD-10-CM

## 2022-07-08 PROCEDURE — 97110 THERAPEUTIC EXERCISES: CPT | Mod: PN

## 2022-07-08 PROCEDURE — 99213 OFFICE O/P EST LOW 20 MIN: CPT | Mod: S$GLB,,, | Performed by: PHYSICIAN ASSISTANT

## 2022-07-08 PROCEDURE — 99213 PR OFFICE/OUTPT VISIT, EST, LEVL III, 20-29 MIN: ICD-10-PCS | Mod: S$GLB,,, | Performed by: PHYSICIAN ASSISTANT

## 2022-07-08 PROCEDURE — 97116 GAIT TRAINING THERAPY: CPT | Mod: PN

## 2022-07-08 RX ORDER — POLYMYXIN B SULFATE AND TRIMETHOPRIM 1; 10000 MG/ML; [USP'U]/ML
1 SOLUTION OPHTHALMIC EVERY 6 HOURS
Qty: 10 ML | Refills: 0 | Status: SHIPPED | OUTPATIENT
Start: 2022-07-08 | End: 2022-07-15

## 2022-07-08 NOTE — PROGRESS NOTES
[facility]   Physical Therapy Treatment and Progress Note     Name: Antonia Maldonado  Clinic Number: [id number]    Therapy Diagnosis:   Encounter Diagnoses   Name Primary?    Bilateral leg weakness Yes    Impaired functional mobility, balance, gait, and endurance      Physician: Remigio Granda II, MD    Visit Date: 7/8/2022    Physician Orders: PT Eval and Treat   Medical Diagnosis from Referral: R27.0 (ICD-10-CM) - Ataxia  Evaluation Date: 1/19/2022 (reassessed 5/6/2022 after lapse in treatment)  Authorization Period Expiration: 12/31/2022  Plan of Care Expiration: 9/30/2022  Visit # / Visits authorized: 11/ 20                      Progress note due:  8/5/2022 (last on 7/8/2022)                Time In: 1:00   Time Out: 1:55  Total Billable Time: 40 minutes     Precautions: Fall and hard of hearing, HTN, osteopenia      SUBJECTIVE     Pt reports: mainly had Right thigh soreness but doing better today.  She feels like her balance is getting a little better.  Still needs the RW.  She is still struggling with uneven surfaces.  Right foot still turns in while walking and she is having difficulty trying to adjust it.  She felt pretty good with her visit today implementing new exercises to address the turning in of her foot and the cues to help her position her foot better while walking.    She was compliant with home exercise program.  Response to previous treatment: no changes  Functional change: none    Pain: no complaints of pain      OBJECTIVE     Hip Range of Motion:    Left active Right active    Flexion 120 deg 115 deg   Abduction 40 40   Extension 6 4      Lower Extremity Strength    RLE LLE   Hip Flexion: 4/5 was 4-/5 4/5 was 4-/5   Hip Extension:  4-/5 was 3+/5 4/5 was 4-/5   Hip Abduction: 4/5 was 3/5 4/5 was 4-/5   Hip Adduction: 4-/5 was 3+/5 4-/5 was 3+/5   Hip External Rotation  3-/5 5/5   Knee Extension: 5/5 5/5   Knee Flexion: 4/5 was 3+/5 4+/5   Ankle Dorsiflexion: 4/5 4/5  "  Ankle Plantarflexion: 4+/5 4+/5         TU sec without FWW  5x sit to stand: 11 seconds with UE support  Feet together, eyes open: 30 seconds, min sway  Feet together, eyes closed: 21 seconds, mod sway      Treatment     Antonia Beltrán received the treatments listed below:      therapeutic exercises to develop strength and endurance for 30 minutes including:   Supine alternating clam with yellow theraband x 3 minutes   Supine clam with yellow theraband (stay equal on the clam ROM) 5" hold x 3'   Supine clam with yellow theraband into bridges (stay equal on clam ROM) 5" holds x 3'   X 20 each seated bilateral lower extremity therapeutic exercise (2#) = marching with ER, mini march with External  Rotation into long  arc quad, seated External Rotation to tap opposite Lower Extremity shin   hip abduction (red theraband), ball squeeze--np   X 10 sit to stand with hands on thighs emphasizing proper technique   X 20 each shuttle mini squats and heel raises (37#)---np   X 5 minutes bike (level 1) as an adjunct to strength/balance/mobility deficits---np     Standing at counter top:   Hip External Rotation (Dorsiflexion foot, turning foot out and then back to neutral to relax) 2 x 10   Hip External Rotation (same as above) into hip abduction 2 x 10    neuromuscular re-education activities to improve: Balance and Proprioception for 00 minutes. The following activities were included:     X 1 minute stand on AirEx   X 15 standing mini squats on AirEx   X 15 alternating toe taps on 3-inch stool   2 x 15 feet each balance training with stand by assist/ contact guard assist = side stepping, backwards gait   X 10 stepping over 4-inch obstacle      gait training to improve functional mobility and safety for 10 minutes, including:    Cue to patient while walking to engage her Right hip External Rotators (reduce in-toeing) by trying to keep Right foot at 1:00 position during Right Lower Extremity stance phase.  Also had her " "progressively increase her gait speed to help make her gait more natural as she was staying stiff and thinking about her gait too much.  Progressed her into better gait speed, better proprioceptive awareness of keeping foot at 1:00 position and looking up to improve safety and awareness of her surroundings.     Up/down 2 x 4 (6-inch) steps with stand by assist and reciprocating gait pattern   Functional ambulation 50 feet with contact guard assist/min assist emphasizing heel strike contact in terminal swing      Patient Education and Home Exercises     Home Exercises Provided and Patient Education Provided     Education provided:   ·  Supine alternating clam with yellow theraband x 3 minutes  ·  Supine clam with yellow theraband (stay equal on the clam ROM) 5" hold x 3'  ·  Supine clam with yellow theraband into bridges (stay equal on clam ROM) 5" holds x 3'  ·  X 20 each seated bilateral lower extremity therapeutic exercise (2#) = marching with ER, mini march with External  Rotation into long  arc quad, seated External Rotation to tap opposite Lower Extremity shin      Standing at counter top:  ·  Hip External Rotation (Dorsiflexion foot, turning foot out and then back to neutral to relax) 2 x 10  ·  Hip External Rotation (same as above) into hip abduction 2 x 10    Written Home Exercises Provided: Patient instructed to cont prior HEP.   Family wrote down and reviewed newer exercises today.      ASSESSMENT     Patient has been making steady gains with her mobility and strength since starting PT.  She was the weakest at her Right hip with External Rotation so more time was spent incorporating supine, seated, and standing exercises that better isolate Right hip External Rotation.  Also gave her a cue to focus on while walking to help improve her gait mechanics and hopefully slowly improve Right hip External Rotation strength due to more appropriate use of her hip muscles while walking and loading her Right Lower " Extremity.  This should also help with her overall balance and safety as she will reduce the Right in-toeing, tripping hazard, narrower IRENE, and improve her weight shift.      Antonia Beltrán Is progressing fairly well towards her goals.   Pt prognosis is Fair.     Pt will continue to benefit from skilled outpatient physical therapy to address the deficits listed in the problem list box on initial evaluation, provide pt/family education and to maximize pt's level of independence in the home and community environment.     Pt's spiritual, cultural and educational needs considered and pt agreeable to plan of care and goals.     Anticipated barriers to physical therapy: severity of pain and instability    Goals:     Short Term Goals:  4 weeks    (progressing)   1. Pt to demonstrate ascending stairs with reciprocal gait 4 steps x 5 without break to be able to ascend flight of stairs at home to enter master bedroom with single handrail and/or use of cane. (PART MET)  2. Increased hip MMT 1/2  to increase tolerance for ADL and work activities. (NOT MET)  3. Pt to ambulate with use of single point cane or LRAD on variable surfaces without loss of balance for 400'. (NOT MET)  4. Pt to tolerate HEP to improve ROM and independence with ADL's. (PART MET)  5. Pt to report no difficulty with light household activities. (NOT MET)     Long Term Goals:  8 weeks     (ongoing)  1.Pt to demonstrate ascending and descending stairs with reciprocal gait and use of single handrail to get to and from master bedroom independently. (PART MET)  2.Pt to demonstrate rotation of R hip equal to L to ambulate with decreased IR on R. (NOT MET)  3.Increased hip MMT 1 grade  to increase tolerance for ADL and work activities. (NOT MET)  4. Pt will ambulate without AD on variable surfaces without loss of balance to return to prior level of function. (NOT MET)  5. Pt to demonstrate R hip flexion strength, plantar flexion strength, and glute strength of >/=4/5  to be able to return to driving tasks including pushing break and gas, and lifting leg between pedals without difficulty (NOT MET)      PLAN     Continue to progress strength/balance/mobility training to patient's tolerance.      Derrick Suárez, PT

## 2022-07-08 NOTE — PATIENT INSTRUCTIONS
"Home Exercises Provided and Patient Education Provided     Education provided:    Supine alternating clam with yellow theraband x 3 minutes   Supine clam with yellow theraband (stay equal on the clam ROM) 5" hold x 3'   Supine clam with yellow theraband into bridges (stay equal on clam ROM) 5" holds x 3'       X 20 each seated bilateral lower extremity therapeutic exercise (2#) = marching with ER, mini march with External Rotation into long arc quad, seated External Rotation to tap opposite Lower Extremity shin      Standing at counter top:   Hip External Rotation (Dorsiflexion foot, turning foot out and then back to neutral to relax) 2 x 10   Hip External Rotation (same as above) into hip abduction 2 x 10    Written Home Exercises Provided: Patient instructed to cont prior HEP.   Family wrote down and reviewed newer exercises today.  "

## 2022-07-08 NOTE — PATIENT INSTRUCTIONS

## 2022-07-08 NOTE — PLAN OF CARE
[facility]   Physical Therapy Treatment and Progress Note     Name: Antonia Maldonado  Clinic Number: [id number]    Therapy Diagnosis:   Encounter Diagnoses   Name Primary?    Bilateral leg weakness Yes    Impaired functional mobility, balance, gait, and endurance      Physician: Remigio Granda II, MD    Visit Date: 7/8/2022    Physician Orders: PT Eval and Treat   Medical Diagnosis from Referral: R27.0 (ICD-10-CM) - Ataxia  Evaluation Date: 1/19/2022 (reassessed 5/6/2022 after lapse in treatment)  Authorization Period Expiration: 12/31/2022  Plan of Care Expiration: 9/30/2022  Visit # / Visits authorized: 11/ 20                      Progress note due:  8/5/2022 (last on 7/8/2022)                Time In: 1:00   Time Out: 1:55  Total Billable Time: 40 minutes     Precautions: Fall and hard of hearing, HTN, osteopenia      SUBJECTIVE     Pt reports: mainly had Right thigh soreness but doing better today.  She feels like her balance is getting a little better.  Still needs the RW.  She is still struggling with uneven surfaces.  Right foot still turns in while walking and she is having difficulty trying to adjust it.  She felt pretty good with her visit today implementing new exercises to address the turning in of her foot and the cues to help her position her foot better while walking.    She was compliant with home exercise program.  Response to previous treatment: no changes  Functional change: none    Pain: no complaints of pain      OBJECTIVE     Hip Range of Motion:    Left active Right active    Flexion 120 deg 115 deg   Abduction 40 40   Extension 6 4      Lower Extremity Strength    RLE LLE   Hip Flexion: 4/5 was 4-/5 4/5 was 4-/5   Hip Extension:  4-/5 was 3+/5 4/5 was 4-/5   Hip Abduction: 4/5 was 3/5 4/5 was 4-/5   Hip Adduction: 4-/5 was 3+/5 4-/5 was 3+/5   Hip External Rotation  3-/5 5/5   Knee Extension: 5/5 5/5   Knee Flexion: 4/5 was 3+/5 4+/5   Ankle Dorsiflexion: 4/5 4/5  "  Ankle Plantarflexion: 4+/5 4+/5         TU sec without FWW  5x sit to stand: 11 seconds with UE support  Feet together, eyes open: 30 seconds, min sway  Feet together, eyes closed: 21 seconds, mod sway      Treatment     Antonia Beltrán received the treatments listed below:      therapeutic exercises to develop strength and endurance for 30 minutes including:   Supine alternating clam with yellow theraband x 3 minutes   Supine clam with yellow theraband (stay equal on the clam ROM) 5" hold x 3'   Supine clam with yellow theraband into bridges (stay equal on clam ROM) 5" holds x 3'   X 20 each seated bilateral lower extremity therapeutic exercise (2#) = marching with ER, mini march with External  Rotation into long  arc quad, seated External Rotation to tap opposite Lower Extremity shin   hip abduction (red theraband), ball squeeze--np   X 10 sit to stand with hands on thighs emphasizing proper technique   X 20 each shuttle mini squats and heel raises (37#)---np   X 5 minutes bike (level 1) as an adjunct to strength/balance/mobility deficits---np     Standing at counter top:   Hip External Rotation (Dorsiflexion foot, turning foot out and then back to neutral to relax) 2 x 10   Hip External Rotation (same as above) into hip abduction 2 x 10    neuromuscular re-education activities to improve: Balance and Proprioception for 00 minutes. The following activities were included:     X 1 minute stand on AirEx   X 15 standing mini squats on AirEx   X 15 alternating toe taps on 3-inch stool   2 x 15 feet each balance training with stand by assist/ contact guard assist = side stepping, backwards gait   X 10 stepping over 4-inch obstacle      gait training to improve functional mobility and safety for 10 minutes, including:    Cue to patient while walking to engage her Right hip External Rotators (reduce in-toeing) by trying to keep Right foot at 1:00 position during Right Lower Extremity stance phase.  Also had her " "progressively increase her gait speed to help make her gait more natural as she was staying stiff and thinking about her gait too much.  Progressed her into better gait speed, better proprioceptive awareness of keeping foot at 1:00 position and looking up to improve safety and awareness of her surroundings.     Up/down 2 x 4 (6-inch) steps with stand by assist and reciprocating gait pattern   Functional ambulation 50 feet with contact guard assist/min assist emphasizing heel strike contact in terminal swing      Patient Education and Home Exercises     Home Exercises Provided and Patient Education Provided     Education provided:   ·  Supine alternating clam with yellow theraband x 3 minutes  ·  Supine clam with yellow theraband (stay equal on the clam ROM) 5" hold x 3'  ·  Supine clam with yellow theraband into bridges (stay equal on clam ROM) 5" holds x 3'  ·  X 20 each seated bilateral lower extremity therapeutic exercise (2#) = marching with ER, mini march with External  Rotation into long  arc quad, seated External Rotation to tap opposite Lower Extremity shin      Standing at counter top:  ·  Hip External Rotation (Dorsiflexion foot, turning foot out and then back to neutral to relax) 2 x 10  ·  Hip External Rotation (same as above) into hip abduction 2 x 10    Written Home Exercises Provided: Patient instructed to cont prior HEP.   Family wrote down and reviewed newer exercises today.      ASSESSMENT     Patient has been making steady gains with her mobility and strength since starting PT.  She was the weakest at her Right hip with External Rotation so more time was spent incorporating supine, seated, and standing exercises that better isolate Right hip External Rotation.  Also gave her a cue to focus on while walking to help improve her gait mechanics and hopefully slowly improve Right hip External Rotation strength due to more appropriate use of her hip muscles while walking and loading her Right Lower " Extremity.  This should also help with her overall balance and safety as she will reduce the Right in-toeing, tripping hazard, narrower IRENE, and improve her weight shift.      Antonia Beltrán Is progressing fairly well towards her goals.   Pt prognosis is Fair.     Pt will continue to benefit from skilled outpatient physical therapy to address the deficits listed in the problem list box on initial evaluation, provide pt/family education and to maximize pt's level of independence in the home and community environment.     Pt's spiritual, cultural and educational needs considered and pt agreeable to plan of care and goals.     Anticipated barriers to physical therapy: severity of pain and instability    Goals:     Short Term Goals:  4 weeks    (progressing)   1. Pt to demonstrate ascending stairs with reciprocal gait 4 steps x 5 without break to be able to ascend flight of stairs at home to enter master bedroom with single handrail and/or use of cane. (PART MET)  2. Increased hip MMT 1/2  to increase tolerance for ADL and work activities. (NOT MET)  3. Pt to ambulate with use of single point cane or LRAD on variable surfaces without loss of balance for 400'. (NOT MET)  4. Pt to tolerate HEP to improve ROM and independence with ADL's. (PART MET)  5. Pt to report no difficulty with light household activities. (NOT MET)     Long Term Goals:  8 weeks     (ongoing)  1.Pt to demonstrate ascending and descending stairs with reciprocal gait and use of single handrail to get to and from master bedroom independently. (PART MET)  2.Pt to demonstrate rotation of R hip equal to L to ambulate with decreased IR on R. (NOT MET)  3.Increased hip MMT 1 grade  to increase tolerance for ADL and work activities. (NOT MET)  4. Pt will ambulate without AD on variable surfaces without loss of balance to return to prior level of function. (NOT MET)  5. Pt to demonstrate R hip flexion strength, plantar flexion strength, and glute strength of >/=4/5  to be able to return to driving tasks including pushing break and gas, and lifting leg between pedals without difficulty (NOT MET)      PLAN     Continue to progress strength/balance/mobility training to patient's tolerance.      Derrick Suárez, PT

## 2022-07-08 NOTE — PROGRESS NOTES
"Subjective:       Patient ID: Antonia Maldonado is a 89 y.o. female.    Vitals:  height is 5' 2" (1.575 m) and weight is 55.8 kg (123 lb). Her temperature is 98.3 °F (36.8 °C). Her blood pressure is 120/57 (abnormal) and her pulse is 80. Her respiration is 16 and oxygen saturation is 97%.     Chief Complaint: Eye Problem    Patient presents to urgent care today for left eye redness that started 3 days ago  Patient states the right one started to do the same yesterday   Patient has not tried any OTC drops for relief.   Patient states the eyes have pooja watery as well    Eye Problem   The left eye is affected. This is a new problem. The current episode started in the past 7 days. The problem occurs constantly. The problem has been gradually worsening. There was no injury mechanism. The pain is at a severity of 2/10. She does not wear contacts. Associated symptoms include an eye discharge (watery) and eye redness. Pertinent negatives include no blurred vision, double vision, fever, foreign body sensation, itching, nausea, photophobia, recent URI or vomiting. She has tried nothing for the symptoms. The treatment provided no relief.       Constitution: Negative for chills and fever.   Eyes: Positive for eye discharge (watery) and eye redness. Negative for eye trauma, eye itching, eye pain, photophobia, vision loss, double vision and blurred vision.   Gastrointestinal: Negative for nausea and vomiting.       Objective:      Physical Exam   Constitutional: She does not appear ill. No distress.   HENT:   Head: Normocephalic and atraumatic.   Ears:   Right Ear: External ear normal.   Left Ear: External ear normal.   Eyes: Lids are normal. Left eye exhibits discharge (watery). Right conjunctiva is injected. Left conjunctiva is injected.   Slit lamp exam:       The right eye shows no fluorescein uptake.        The left eye shows no fluorescein uptake. Extraocular movement intact   Pulmonary/Chest: Effort normal. No respiratory " distress.   Abdominal: Normal appearance.   Neurological: no focal deficit. She is alert.   Skin: Skin is warm, dry and not pale. jaundice  Psychiatric: Her behavior is normal. Mood, judgment and thought content normal.   Nursing note and vitals reviewed.        Assessment:       1. Conjunctivitis of both eyes, unspecified conjunctivitis type          Plan:         Conjunctivitis of both eyes, unspecified conjunctivitis type    Other orders  -     polymyxin B sulf-trimethoprim (POLYTRIM) 10,000 unit- 1 mg/mL Drop; Place 1 drop into both eyes every 6 (six) hours. for 7 days  Dispense: 10 mL; Refill: 0    Patient did have appointment with ophthalmologist 2 weeks ago.  No abnormalities were found on that exam per patient.  Discussed with patient and daughter if symptoms do not improve in 48 hours should follow up with ophthalmologist.     Patient Instructions   You must understand that you've received an Urgent Care treatment only and that you may be released before all your medical problems are known or treated. You, the patient, will arrange for follow up care as instructed.  Follow up with your PCP or specialty clinic as directed in the next 1-2 weeks if not improved or as needed.  You can call [phone] to schedule an appointment with the appropriate provider.  If your condition worsens we recommend that you receive another evaluation at the emergency room immediately or contact your primary medical clinics after hours call service to discuss your concerns.  Please return here or go to the Emergency Department for any concerns or worsening of condition.

## 2022-07-13 PROBLEM — R82.90 ABNORMAL URINALYSIS: Status: RESOLVED | Noted: 2021-09-20 | Resolved: 2022-07-13

## 2022-07-13 PROBLEM — R94.31 ABNORMAL ELECTROCARDIOGRAM: Status: RESOLVED | Noted: 2021-09-18 | Resolved: 2022-07-13

## 2022-07-19 ENCOUNTER — OUTPATIENT CASE MANAGEMENT (OUTPATIENT)
Dept: ADMINISTRATIVE | Facility: OTHER | Age: 87
End: 2022-07-19
Payer: MEDICARE

## 2022-07-19 NOTE — PROGRESS NOTES
Referral to [facility] received from Pt's PCP office. Sw completed outreach call to patient/caregiver. Pt's caregiver stated no needs at this time. She reported all of Pt's care needs were being addressed. Low/mod risk referral closed.

## 2022-08-05 ENCOUNTER — CLINICAL SUPPORT (OUTPATIENT)
Dept: REHABILITATION | Facility: HOSPITAL | Age: 87
End: 2022-08-05
Payer: MEDICARE

## 2022-08-05 DIAGNOSIS — Z74.09 IMPAIRED FUNCTIONAL MOBILITY, BALANCE, GAIT, AND ENDURANCE: ICD-10-CM

## 2022-08-05 DIAGNOSIS — R29.898 BILATERAL LEG WEAKNESS: Primary | ICD-10-CM

## 2022-08-05 PROCEDURE — 97112 NEUROMUSCULAR REEDUCATION: CPT | Mod: PN

## 2022-08-05 PROCEDURE — 97110 THERAPEUTIC EXERCISES: CPT | Mod: PN

## 2022-08-05 NOTE — PROGRESS NOTES
[facility]   Physical Therapy Treatment and Progress Note     Name: Antonia Maldonado  Clinic Number: [id number]    Therapy Diagnosis:   Encounter Diagnoses   Name Primary?    Bilateral leg weakness Yes    Impaired functional mobility, balance, gait, and endurance        Physician: Remigio Granda II, MD    Visit Date: 2022    Physician Orders: PT Eval and Treat   Medical Diagnosis from Referral: R27.0 (ICD-10-CM) - Ataxia  Evaluation Date: 2022 (reassessed 2022 after lapse in treatment)  Authorization Period Expiration: 2022  Plan of Care Expiration: 2022  Visit # / Visits authorized:                       Progress note due:  2022               Time In: 1:00 pm  Time Out: 1:58 pm  Total Billable Time: 58 minutes     Precautions: Fall and hard of hearing, HTN, osteopenia      SUBJECTIVE     Pt reports: mainly had Right thigh soreness but doing better today.  She feels like her balance is getting a little better.  Still needs the RW.  She hasn't been performing HEP at all.  She was not compliant with home exercise program.  Response to previous treatment: no changes  Functional change: none    Pain: no complaints of pain      OBJECTIVE     Hip Range of Motion:    Left active Right active    Flexion 120 deg 115 deg   Abduction 40 40   Extension 6 4      Lower Extremity Strength    RLE LLE   Hip Flexion: 4/5 was 4-/5 4/5 was 4-/5   Hip Extension:  4-/5 was 3+/5 4/5 was 4-/5   Hip Abduction: 4/5 was 3/5 4/5 was 4-/5   Hip Adduction: 4-/5 was 3+/5 4-/5 was 3+/5   Hip External Rotation  3-/5 5/5   Knee Extension: 5/5 5/5   Knee Flexion: 4/5 was 3+/5 4+/5   Ankle Dorsiflexion: 4/5 4/5   Ankle Plantarflexion: 4+/5 4+/5         TU sec without FWW  5x sit to stand: 11 seconds with UE support  Feet together, eyes open: 30 seconds, min sway  Feet together, eyes closed: 21 seconds, mod sway      Treatment     Antonia Beltrán received the treatments listed below:   "    therapeutic exercises to develop strength and endurance for 33 minutes including:   Supine alternating clam with yellow theraband x 3 minutes   Supine clam with yellow theraband (stay equal on the clam ROM) 5" hold x 3'   Supine clam with yellow theraband into bridges (stay equal on clam ROM) 5" holds x 3'   X 20 each seated bilateral lower extremity therapeutic exercise (2#) = marching with ER, mini march with External  Rotation into long  arc quad, seated External Rotation to tap opposite Lower Extremity shin   hip abduction (red theraband), ball squeeze--np   X 10 sit to stand with hands on thighs emphasizing proper technique   X 20 each shuttle mini squats and heel raises (37#)---np       NP:   Standing at counter top:   Hip External Rotation (Dorsiflexion foot, turning foot out and then back to neutral to relax) 2 x 10   Hip External Rotation (same as above) into hip abduction 2 x 10    neuromuscular re-education activities to improve: Balance and Proprioception for 15 minutes. The following activities were included:     X 1 minute stand on AirEx   X 15 standing mini squats on AirEx   X 15 alternating toe taps on 3-inch stool   2 x 15 feet each balance training with stand by assist/ contact guard assist = side stepping, backwards gait   X 10 stepping over 4-inch obstacle      gait training to improve functional mobility and safety for 10 minutes, including:    Cue to patient while walking to engage her Right hip External Rotators (reduce in-toeing) by trying to keep Right foot at 1:00 position during Right Lower Extremity stance phase.  Also had her progressively increase her gait speed to help make her gait more natural as she was staying stiff and thinking about her gait too much. Progressed her into better gait speed, better proprioceptive awareness of keeping foot at 1:00 position and looking up to improve safety and awareness of her surroundings.     Up/down 2 x 4 (6-inch) steps with stand by assist " "and reciprocating gait pattern   Functional ambulation 50 feet with contact guard assist/min assist emphasizing heel strike contact in terminal swing      Patient Education and Home Exercises     Home Exercises Provided and Patient Education Provided     Education provided:   ·  Supine alternating clam with yellow theraband x 3 minutes  ·  Supine clam with yellow theraband (stay equal on the clam ROM) 5" hold x 3'  ·  Supine clam with yellow theraband into bridges (stay equal on clam ROM) 5" holds x 3'  ·  X 20 each seated bilateral lower extremity therapeutic exercise (2#) = marching with ER, mini march with External  Rotation into long  arc quad, seated External Rotation to tap opposite Lower Extremity shin      Standing at counter top:  ·  Hip External Rotation (Dorsiflexion foot, turning foot out and then back to neutral to relax) 2 x 10  ·  Hip External Rotation (same as above) into hip abduction 2 x 10    Written Home Exercises Provided: Patient instructed to cont prior HEP.   Family wrote down and reviewed newer exercises today.      ASSESSMENT     Patient has been making steady gains with her mobility and strength since starting PT. Continued to focus on regaining R ER hip strength to improve gait pattern and balance. Pt educated that soreness in RLE is normal with exercise for 1-2 days after. Reviewed standing hip strengthening exercises to cue range of motion and prevent lumbar musculature from doing the work. This should also help with her overall balance and safety as she will reduce the Right in-toeing, tripping hazard, narrower IRENE, and improve her weight shift.      Antonia Beltrán Is progressing fairly well towards her goals.   Pt prognosis is Fair.     Pt will continue to benefit from skilled outpatient physical therapy to address the deficits listed in the problem list box on initial evaluation, provide pt/family education and to maximize pt's level of independence in the home and community environment. "     Pt's spiritual, cultural and educational needs considered and pt agreeable to plan of care and goals.     Anticipated barriers to physical therapy: severity of pain and instability    Goals:     Short Term Goals:  4 weeks    (progressing)   1. Pt to demonstrate ascending stairs with reciprocal gait 4 steps x 5 without break to be able to ascend flight of stairs at home to enter master bedroom with single handrail and/or use of cane. (PART MET)  2. Increased hip MMT 1/2  to increase tolerance for ADL and work activities. (NOT MET)  3. Pt to ambulate with use of single point cane or LRAD on variable surfaces without loss of balance for 400'. (NOT MET)  4. Pt to tolerate HEP to improve ROM and independence with ADL's. (PART MET)  5. Pt to report no difficulty with light household activities. (NOT MET)     Long Term Goals:  8 weeks     (ongoing)  1.Pt to demonstrate ascending and descending stairs with reciprocal gait and use of single handrail to get to and from master bedroom independently. (PART MET)  2.Pt to demonstrate rotation of R hip equal to L to ambulate with decreased IR on R. (NOT MET)  3.Increased hip MMT 1 grade  to increase tolerance for ADL and work activities. (NOT MET)  4. Pt will ambulate without AD on variable surfaces without loss of balance to return to prior level of function. (NOT MET)  5. Pt to demonstrate R hip flexion strength, plantar flexion strength, and glute strength of >/=4/5 to be able to return to driving tasks including pushing break and gas, and lifting leg between pedals without difficulty (NOT MET)      PLAN     Continue to progress strength/balance/mobility training to patient's tolerance.      Cynthia Meier, PT

## 2022-08-12 ENCOUNTER — CLINICAL SUPPORT (OUTPATIENT)
Dept: REHABILITATION | Facility: HOSPITAL | Age: 87
End: 2022-08-12
Payer: MEDICARE

## 2022-08-12 DIAGNOSIS — R29.898 BILATERAL LEG WEAKNESS: Primary | ICD-10-CM

## 2022-08-12 DIAGNOSIS — Z74.09 IMPAIRED FUNCTIONAL MOBILITY, BALANCE, GAIT, AND ENDURANCE: ICD-10-CM

## 2022-08-12 PROCEDURE — 97110 THERAPEUTIC EXERCISES: CPT | Mod: PN

## 2022-08-12 PROCEDURE — 97116 GAIT TRAINING THERAPY: CPT | Mod: PN

## 2022-08-12 PROCEDURE — 97112 NEUROMUSCULAR REEDUCATION: CPT | Mod: PN

## 2022-08-12 NOTE — PROGRESS NOTES
[facility]   Physical Therapy Treatment and Progress Note     Name: Antonia Maldonado  Clinic Number: [id number]    Therapy Diagnosis:   Encounter Diagnoses   Name Primary?    Bilateral leg weakness Yes    Impaired functional mobility, balance, gait, and endurance        Physician: Remigio Granda II, MD    Visit Date: 2022    Physician Orders: PT Eval and Treat   Medical Diagnosis from Referral: R27.0 (ICD-10-CM) - Ataxia  Evaluation Date: 2022 (reassessed 2022 after lapse in treatment)  Authorization Period Expiration: 2022  Plan of Care Expiration: 2022  Visit # / Visits authorized:                       Progress note due:  2022               Time In: 1:12 pm  Time Out: 1:08 pm  Total Billable Time: 56 minutes     Precautions: Fall and hard of hearing, HTN, osteopenia      SUBJECTIVE     Pt reports: mainly had Right thigh soreness but doing better today.  She feels like her balance is getting a little better.  Still needs the RW.  She hasn't been performing HEP at all.  She was not compliant with home exercise program.  Response to previous treatment: no changes  Functional change: none    Pain: no complaints of pain      OBJECTIVE     Hip Range of Motion:    Left active Right active    Flexion 120 deg 117 deg   Abduction 40 40   Extension 6 4      Lower Extremity Strength    RLE LLE   Hip Flexion: 4/5 was 4-/5 4/5 was 4-/5   Hip Extension:  4-/5 was 3+/5 4/5 was 4-/5   Hip Abduction: 4/5 was 3/5 4/5 was 4-/5   Hip Adduction: 4-/5 was 3+/5 4-/5 was 3+/5   Hip External Rotation  3-/5 5/5   Knee Extension: 5/5 5/5   Knee Flexion: 4/5 was 3+/5 4+/5   Ankle Dorsiflexion: 4/5 4/5   Ankle Plantarflexion: 4+/5 4+/5         TU sec without FWW  5x sit to stand: 11 seconds with UE support  Feet together, eyes open: 30 seconds, min sway  Feet together, eyes closed: 21 seconds, mod sway      Treatment     Antonia Beltrán received the treatments listed below:   "    therapeutic exercises to develop strength and endurance for 31 minutes including:    reassessment   Supine alternating clam with yellow theraband x 3 minutes   Supine clam with yellow theraband (stay equal on the clam ROM) 5" hold x 3'   Supine clam with yellow theraband into bridges (stay equal on clam ROM) 5" holds x 3'   X 20 each seated bilateral lower extremity therapeutic exercise (2#) = marching with ER, mini march with External  Rotation into long  arc quad, seated External Rotation to tap opposite Lower Extremity shin   hip abduction (red theraband), ball squeeze--np   X 10 sit to stand with hands on thighs emphasizing proper technique   X 20 each shuttle mini squats and heel raises (37#)---np       NP:   Standing at counter top:   Hip External Rotation (Dorsiflexion foot, turning foot out and then back to neutral to relax) 2 x 10   Hip External Rotation (same as above) into hip abduction 2 x 10    neuromuscular re-education activities to improve: Balance and Proprioception for 15 minutes. The following activities were included:     X 1 minute stand on AirEx   X 15 standing mini squats on AirEx   X 15 alternating toe taps on 3-inch stool   2 x 15 feet each balance training with stand by assist/ contact guard assist = side stepping, backwards gait   X 10 stepping over 4-inch obstacle      gait training to improve functional mobility and safety for 10 minutes, including:    Cue to patient while walking to engage her Right hip External Rotators (reduce in-toeing) by trying to keep Right foot at 1:00 position during Right Lower Extremity stance phase.  Also had her progressively increase her gait speed to help make her gait more natural as she was staying stiff and thinking about her gait too much. Progressed her into better gait speed, better proprioceptive awareness of keeping foot at 1:00 position and looking up to improve safety and awareness of her surroundings.     Up/down 2 x 4 (6-inch) steps with " "stand by assist and reciprocating gait pattern   Functional ambulation 50 feet with contact guard assist/min assist emphasizing heel strike contact in terminal swing      Patient Education and Home Exercises     Home Exercises Provided and Patient Education Provided     Education provided:   ·  Supine alternating clam with yellow theraband x 3 minutes  ·  Supine clam with yellow theraband (stay equal on the clam ROM) 5" hold x 3'  ·  Supine clam with yellow theraband into bridges (stay equal on clam ROM) 5" holds x 3'  ·  X 20 each seated bilateral lower extremity therapeutic exercise (2#) = marching with ER, mini march with External  Rotation into long  arc quad, seated External Rotation to tap opposite Lower Extremity shin      Standing at counter top:  ·  Hip External Rotation (Dorsiflexion foot, turning foot out and then back to neutral to relax) 2 x 10  ·  Hip External Rotation (same as above) into hip abduction 2 x 10    Written Home Exercises Provided: Patient instructed to cont prior HEP.   Family wrote down and reviewed newer exercises today.      ASSESSMENT     Patient was reassessed by PT today. Pt with improvement from initial evaluation but no significant change since last reassessment. No follow through with HEP since last reassessed. Discussed importance of exercise outside of physical therapy to improve carry over. Continued to focus on regaining R ER hip strength to improve gait pattern and balance. This should also help with her overall balance and safety as she will reduce the Right in-toeing, tripping hazard, narrower IRENE, and improve her weight shift.      Antonia Beltrán Is progressing fairly well towards her goals.   Pt prognosis is Fair.     Pt will continue to benefit from skilled outpatient physical therapy to address the deficits listed in the problem list box on initial evaluation, provide pt/family education and to maximize pt's level of independence in the home and community environment. "     Pt's spiritual, cultural and educational needs considered and pt agreeable to plan of care and goals.     Anticipated barriers to physical therapy: severity of pain and instability    Goals:     Short Term Goals:  4 weeks    (progressing)   1. Pt to demonstrate ascending stairs with reciprocal gait 4 steps x 5 without break to be able to ascend flight of stairs at home to enter master bedroom with single handrail and/or use of cane. (PART MET)  2. Increased hip MMT 1/2  to increase tolerance for ADL and work activities. (NOT MET)  3. Pt to ambulate with use of single point cane or LRAD on variable surfaces without loss of balance for 400'. (NOT MET)  4. Pt to tolerate HEP to improve ROM and independence with ADL's. (PART MET)  5. Pt to report no difficulty with light household activities. (NOT MET)     Long Term Goals:  8 weeks     (ongoing)  1.Pt to demonstrate ascending and descending stairs with reciprocal gait and use of single handrail to get to and from master bedroom independently. (PART MET)  2.Pt to demonstrate rotation of R hip equal to L to ambulate with decreased IR on R. (NOT MET)  3.Increased hip MMT 1 grade  to increase tolerance for ADL and work activities. (NOT MET)  4. Pt will ambulate without AD on variable surfaces without loss of balance to return to prior level of function. (NOT MET)  5. Pt to demonstrate R hip flexion strength, plantar flexion strength, and glute strength of >/=4/5 to be able to return to driving tasks including pushing break and gas, and lifting leg between pedals without difficulty (NOT MET)      PLAN     Continue to progress strength/balance/mobility training to patient's tolerance.      Cynthia Meier, PT

## 2022-08-26 ENCOUNTER — CLINICAL SUPPORT (OUTPATIENT)
Dept: REHABILITATION | Facility: HOSPITAL | Age: 87
End: 2022-08-26
Payer: MEDICARE

## 2022-08-26 DIAGNOSIS — R29.898 BILATERAL LEG WEAKNESS: Primary | ICD-10-CM

## 2022-08-26 DIAGNOSIS — Z74.09 IMPAIRED FUNCTIONAL MOBILITY, BALANCE, GAIT, AND ENDURANCE: ICD-10-CM

## 2022-08-26 PROCEDURE — 97110 THERAPEUTIC EXERCISES: CPT | Mod: PN

## 2022-08-26 NOTE — PROGRESS NOTES
[facility]   Physical Therapy Treatment     Name: Antonia Maldonado  Clinic Number: [id number]    Therapy Diagnosis:   Encounter Diagnoses   Name Primary?    Bilateral leg weakness Yes    Impaired functional mobility, balance, gait, and endurance        Physician: Remigio Granda II, MD    Visit Date: 2022    Physician Orders: PT Eval and Treat   Medical Diagnosis from Referral: R27.0 (ICD-10-CM) - Ataxia  Evaluation Date: 2022 (reassessed 2022 after lapse in treatment)  Authorization Period Expiration: 2022  Plan of Care Expiration: 2022  Visit # / Visits authorized:                       Progress note due:  2022               Time In: 1:05 pm  Time Out: 1:50 pm  Total Billable Time: 45 minutes  1:1 time: 31 minutes     Precautions: Fall and hard of hearing, HTN, osteopenia      SUBJECTIVE     Pt reports: mainly had Right thigh soreness but doing better today.  She feels like her balance is getting a little better.  Still needs the RW.  She hasn't been performing HEP often, maybe 1-2 exercises since I saw her last.  She was not compliant with home exercise program.  Response to previous treatment: no changes  Functional change: Improved strength, reduced pain since initial evaluation,     Pain: no complaints of pain      OBJECTIVE     Hip Range of Motion:    Left active Right active    Flexion 120 deg 117 deg   Abduction 40 40   Extension 6 4      Lower Extremity Strength    RLE LLE   Hip Flexion: 4/5 was 4-/5 4/5 was 4-/5   Hip Extension:  4-/5 was 3+/5 4/5 was 4-/5   Hip Abduction: 4/5 was 3/5 4/5 was 4-/5   Hip Adduction: 4-/5 was 3+/5 4-/5 was 3+/5   Hip External Rotation  3-/5 5/5   Knee Extension: 5/5 5/5   Knee Flexion: 4/5 was 3+/5 4+/5   Ankle Dorsiflexion: 4/5 4/5   Ankle Plantarflexion: 4+/5 4+/5         TU sec without FWW  5x sit to stand: 14 seconds with UE support  Feet together, eyes open: 30 seconds, min sway  Feet together, eyes closed:  "<2 seconds, mod sway      Treatment     Antonia Beltrán received the treatments listed below:      therapeutic exercises to develop strength and endurance for 35 minutes including:       Supine alternating clam with yellow theraband x 3 minutes   Supine clam with yellow theraband (stay equal on the clam ROM) 5" hold x 3'   Supine clam with yellow theraband into bridges (stay equal on clam ROM) 5" holds x 3'   X 20 each seated bilateral lower extremity therapeutic exercise (2#) = marching with ER, mini march with External  Rotation into long  arc quad, seated External Rotation to tap opposite Lower Extremity shin   hip abduction (red theraband), ball squeeze--np   X 10 sit to stand with hands on thighs emphasizing proper technique   X 20 each shuttle mini squats and heel raises (37#)---np       NP:   Standing at counter top:   Hip External Rotation (Dorsiflexion foot, turning foot out and then back to neutral to relax) 2 x 10   Hip External Rotation (same as above) into hip abduction 2 x 10    neuromuscular re-education activities to improve: Balance and Proprioception for 00 minutes. The following activities were included:     X 1 minute stand on AirEx   X 15 standing mini squats on AirEx   X 15 alternating toe taps on 3-inch stool   2 x 15 feet each balance training with stand by assist/ contact guard assist = side stepping, backwards gait   X 10 stepping over 4-inch obstacle      gait training to improve functional mobility and safety for 10 minutes, including:    Cue to patient while walking to engage her Right hip External Rotators (reduce in-toeing) by trying to keep Right foot at 1:00 position during Right Lower Extremity stance phase.  Also had her progressively increase her gait speed to help make her gait more natural as she was staying stiff and thinking about her gait too much. Progressed her into better gait speed, better proprioceptive awareness of keeping foot at 1:00 position and looking up to improve safety " "and awareness of her surroundings.     Up/down 2 x 4 (6-inch) steps with stand by assist and reciprocating gait pattern   Functional ambulation 50 feet with contact guard assist/min assist emphasizing heel strike contact in terminal swing      Patient Education and Home Exercises     Home Exercises Provided and Patient Education Provided     Education provided:    Supine alternating clam with yellow theraband x 3 minutes   Supine clam with yellow theraband (stay equal on the clam ROM) 5" hold x 3'   Supine clam with yellow theraband into bridges (stay equal on clam ROM) 5" holds x 3'   X 20 each seated bilateral lower extremity therapeutic exercise (2#) = marching with ER, mini march with External  Rotation into long  arc quad, seated External Rotation to tap opposite Lower Extremity shin      Standing at counter top:   Hip External Rotation (Dorsiflexion foot, turning foot out and then back to neutral to relax) 2 x 10   Hip External Rotation (same as above) into hip abduction 2 x 10    Written Home Exercises Provided: Patient instructed to cont prior HEP.   Family wrote down and reviewed newer exercises today.      ASSESSMENT     Patient continues to ambulate with in-toeing on R effecting confidence in balance and gait. Weakness in glute med and max continues. She demonstrates ability to perform sit to stands with use of BUE on lap. Ambulates in household without AD intermittently, but currently unable to safely ascend stairs. She enjoys gardening but is unable due to instability on uneven surfaces and difficulty with mobility in backward with use of AD. Pt remains non-compliant with home exercises effecting improvement in strength and carry over of balance and gait activities.       Antonia Beltrán Is progressing fairly well towards her goals.   Pt prognosis is Fair.     Pt will continue to benefit from skilled outpatient physical therapy to address the deficits listed in the problem list box on initial evaluation, " provide pt/family education and to maximize pt's level of independence in the home and community environment.     Pt's spiritual, cultural and educational needs considered and pt agreeable to plan of care and goals.     Anticipated barriers to physical therapy: severity of pain and instability    Goals:     Short Term Goals:  4 weeks    (progressing)   1. Pt to demonstrate ascending stairs with reciprocal gait 4 steps x 5 without break to be able to ascend flight of stairs at home to enter master bedroom with single handrail and/or use of cane. (PART MET)  2. Increased hip MMT 1/2  to increase tolerance for ADL and work activities. (NOT MET)  3. Pt to ambulate with use of single point cane or LRAD on variable surfaces without loss of balance for 400'. (NOT MET)  4. Pt to tolerate HEP to improve ROM and independence with ADL's. (PART MET)  5. Pt to report no difficulty with light household activities. (NOT MET)     Long Term Goals:  8 weeks     (ongoing)  1.Pt to demonstrate ascending and descending stairs with reciprocal gait and use of single handrail to get to and from master bedroom independently. (PART MET)  2.Pt to demonstrate rotation of R hip equal to L to ambulate with decreased IR on R. (NOT MET)  3.Increased hip MMT 1 grade  to increase tolerance for ADL and work activities. (NOT MET)  4. Pt will ambulate without AD on variable surfaces without loss of balance to return to prior level of function. (NOT MET)  5. Pt to demonstrate R hip flexion strength, plantar flexion strength, and glute strength of >/=4/5 to be able to return to driving tasks including pushing break and gas, and lifting leg between pedals without difficulty (NOT MET)      PLAN     Continue to progress strength/balance/mobility training to patient's tolerance.      Cynthia Meier, PT

## 2022-09-02 ENCOUNTER — CLINICAL SUPPORT (OUTPATIENT)
Dept: REHABILITATION | Facility: HOSPITAL | Age: 87
End: 2022-09-02
Payer: MEDICARE

## 2022-09-02 DIAGNOSIS — Z74.09 IMPAIRED FUNCTIONAL MOBILITY, BALANCE, GAIT, AND ENDURANCE: ICD-10-CM

## 2022-09-02 DIAGNOSIS — R29.898 BILATERAL LEG WEAKNESS: Primary | ICD-10-CM

## 2022-09-02 PROCEDURE — 97110 THERAPEUTIC EXERCISES: CPT | Mod: PN

## 2022-09-02 NOTE — PROGRESS NOTES
"[facility]   Physical Therapy Treatment     Name: Antonia Maldonado  Clinic Number: [id number]    Therapy Diagnosis:   Encounter Diagnoses   Name Primary?    Bilateral leg weakness Yes    Impaired functional mobility, balance, gait, and endurance        Physician: Remigio Granda II, MD    Visit Date: 2022    Physician Orders: PT Eval and Treat   Medical Diagnosis from Referral: R27.0 (ICD-10-CM) - Ataxia  Evaluation Date: 2022 (reassessed 2022 after lapse in treatment)  Authorization Period Expiration: 2022  Plan of Care Expiration: 2022  Visit # / Visits authorized:                       Progress note due:  2022               Time In: 12:45 pm  Time Out: 1:30 pm  Total Billable Time:45 minutes  1:1 time: 45 minutes     Precautions: Fall and hard of hearing, HTN, osteopenia      SUBJECTIVE     Pt reports: She is feeling pretty good today. Did perform her HEP. Has some soreness to anterior R thigh when performs exercises but it does not last long.    She was not compliant with home exercise program.  Response to previous treatment: no changes  Functional change: Improved strength, reduced pain since initial evaluation,     Pain: no complaints of pain      OBJECTIVE     BP: 127/71         TU sec without FWW  5x sit to stand: 14 seconds with UE support  Feet together, eyes open: 30 seconds, min sway  Feet together, eyes closed: 15 seconds, mod sway      Treatment     Antonia Beltrán received the treatments listed below:      therapeutic exercises to develop strength and endurance for 35 minutes including:       Supine alternating clam with yellow theraband x 3 minutes   Supine clam with yellow theraband (stay equal on the clam ROM) 5" hold x 3'   Supine clam with yellow theraband into bridges (stay equal on clam ROM) 5" holds x 3'   X 20 each seated bilateral lower extremity therapeutic exercise (2#) = marching with ER, mini march with External  Rotation into long " " arc quad, seated External Rotation to tap opposite Lower Extremity shin    X 10 sit to stand with hands on thighs emphasizing proper technique   X 20 each shuttle mini squats and heel raises (37#)---np  Supine windshield wipers (Hip ER/IR)  Supine hip abd x 15  SL hip abd 2 x 8     NP:   Standing at counter top:   Hip External Rotation (Dorsiflexion foot, turning foot out and then back to neutral to relax) 2 x 10   Hip External Rotation (same as above) into hip abduction 2 x 10    neuromuscular re-education activities to improve: Balance and Proprioception for 00 minutes. The following activities were included:     X 1 minute stand on AirEx   X 15 standing mini squats on AirEx   X 15 alternating toe taps on 3-inch stool   2 x 15 feet each balance training with stand by assist/ contact guard assist = side stepping, backwards gait   X 10 stepping over 4-inch obstacle      gait training to improve functional mobility and safety for 10 minutes, including:      Functional ambulation 50 feet with SBA-CGA emphasizing heel strike contact in terminal swing and ER of R hip over uneven surface and up/down 6 inch steps with handrail      Patient Education and Home Exercises     Home Exercises Provided and Patient Education Provided     Education provided:    Supine alternating clam with yellow theraband x 3 minutes   Supine clam with yellow theraband (stay equal on the clam ROM) 5" hold x 3'   Supine clam with yellow theraband into bridges (stay equal on clam ROM) 5" holds x 3'   X 20 each seated bilateral lower extremity therapeutic exercise (2#) = marching with ER, mini march with External  Rotation into long  arc quad, seated External Rotation to tap opposite Lower Extremity shin      Standing at counter top:   Hip External Rotation (Dorsiflexion foot, turning foot out and then back to neutral to relax) 2 x 10   Hip External Rotation (same as above) into hip abduction 2 x 10    Written Home Exercises Provided: Patient " instructed to cont prior HEP.   Family wrote down and reviewed newer exercises today.      ASSESSMENT     Patient continues to ambulate with in-toeing on R effecting confidence in balance and gait. Weakness in glute med and max continues, but pt reports no significant limitations in daily activities. Discussed performing HEP independently for 2-3 weeks and following  up with PT to determine need for skilled intervention at that point. Educated on importance of consistency with HEP to see carry over for hip strengthening. Given updated HEP to allow for improved compliance. Will follow up in 2-3 weeks.      Antonia Beltrán Is progressing fairly well towards her goals.   Pt prognosis is Fair.     Pt will continue to benefit from skilled outpatient physical therapy to address the deficits listed in the problem list box on initial evaluation, provide pt/family education and to maximize pt's level of independence in the home and community environment.     Pt's spiritual, cultural and educational needs considered and pt agreeable to plan of care and goals.     Anticipated barriers to physical therapy: severity of pain and instability    Goals:     Short Term Goals:  4 weeks    (progressing)   1. Pt to demonstrate ascending stairs with reciprocal gait 4 steps x 5 without break to be able to ascend flight of stairs at home to enter master bedroom with single handrail and/or use of cane. (PART MET)  2. Increased hip MMT 1/2  to increase tolerance for ADL and work activities. (NOT MET)  3. Pt to ambulate with use of single point cane or LRAD on variable surfaces without loss of balance for 400'. (NOT MET)  4. Pt to tolerate HEP to improve ROM and independence with ADL's. (PART MET)  5. Pt to report no difficulty with light household activities. (NOT MET)     Long Term Goals:  8 weeks     (ongoing)  1.Pt to demonstrate ascending and descending stairs with reciprocal gait and use of single handrail to get to and from master bedroom  independently. (PART MET)  2.Pt to demonstrate rotation of R hip equal to L to ambulate with decreased IR on R. (NOT MET)  3.Increased hip MMT 1 grade  to increase tolerance for ADL and work activities. (NOT MET)  4. Pt will ambulate without AD on variable surfaces without loss of balance to return to prior level of function. (NOT MET)  5. Pt to demonstrate R hip flexion strength, plantar flexion strength, and glute strength of >/=4/5 to be able to return to driving tasks including pushing break and gas, and lifting leg between pedals without difficulty (NOT MET)      PLAN     Continue to progress strength/balance/mobility training to patient's tolerance.      Cynthia Meier, PT

## 2022-09-07 DIAGNOSIS — S72.001A CLOSED DISPLACED FRACTURE OF RIGHT FEMORAL NECK: Primary | ICD-10-CM

## 2022-09-08 ENCOUNTER — HOSPITAL ENCOUNTER (OUTPATIENT)
Dept: RADIOLOGY | Facility: HOSPITAL | Age: 87
Discharge: HOME | End: 2022-09-08
Attending: ORTHOPAEDIC SURGERY
Payer: MEDICARE

## 2022-09-08 ENCOUNTER — OFFICE VISIT (OUTPATIENT)
Dept: ORTHOPEDICS | Facility: CLINIC | Age: 87
End: 2022-09-08
Payer: MEDICARE

## 2022-09-08 VITALS — HEIGHT: 62 IN | BODY MASS INDEX: 23.19 KG/M2 | WEIGHT: 126 LBS

## 2022-09-08 DIAGNOSIS — S72.001A CLOSED DISPLACED FRACTURE OF RIGHT FEMORAL NECK: Primary | ICD-10-CM

## 2022-09-08 DIAGNOSIS — S72.001A CLOSED DISPLACED FRACTURE OF RIGHT FEMORAL NECK: ICD-10-CM

## 2022-09-08 PROCEDURE — 99999 PR PBB SHADOW E&M-EST. PATIENT-LVL III: CPT | Mod: PBBFAC,,, | Performed by: ORTHOPAEDIC SURGERY

## 2022-09-08 PROCEDURE — 99214 PR OFFICE/OUTPT VISIT, EST, LEVL IV, 30-39 MIN: ICD-10-PCS | Mod: S$PBB,,, | Performed by: ORTHOPAEDIC SURGERY

## 2022-09-08 PROCEDURE — 99213 OFFICE O/P EST LOW 20 MIN: CPT | Mod: PBBFAC,PN | Performed by: ORTHOPAEDIC SURGERY

## 2022-09-08 PROCEDURE — 73502 XR ORTHO PELVIS_HIP POST OP RIGHT: ICD-10-PCS | Mod: 26,RT,, | Performed by: RADIOLOGY

## 2022-09-08 PROCEDURE — 73502 X-RAY EXAM HIP UNI 2-3 VIEWS: CPT | Mod: 26,RT,, | Performed by: RADIOLOGY

## 2022-09-08 PROCEDURE — 99999 PR PBB SHADOW E&M-EST. PATIENT-LVL III: ICD-10-PCS | Mod: PBBFAC,,, | Performed by: ORTHOPAEDIC SURGERY

## 2022-09-08 PROCEDURE — 99214 OFFICE O/P EST MOD 30 MIN: CPT | Mod: S$PBB,,, | Performed by: ORTHOPAEDIC SURGERY

## 2022-09-08 PROCEDURE — 73502 X-RAY EXAM HIP UNI 2-3 VIEWS: CPT | Mod: TC,PO,RT

## 2022-09-08 RX ORDER — TRIAMCINOLONE ACETONIDE 1 MG/G
CREAM TOPICAL
COMMUNITY
Start: 2022-07-14

## 2022-09-08 NOTE — PROGRESS NOTES
Chief Complaint   Patient presents with    Right Hip - Follow-up       HPI:   This is a 89 y.o. who returns today status post right hip hemiarthroplasty 1 years ago. Patient has been FWB.  Pain is dull. No numbness or tingling. No associated signs or symptoms.    Past Medical History:   Diagnosis Date    Adenocarcinoma, breast     Anemia     Anxiety and depression     Breast cancer     Constipation     Depression     Hearing loss     History of recurrent UTIs     Hypertension     Hypothyroidism     IBS (irritable bowel syndrome)     Interstitial cystitis     Sleep apnea      Past Surgical History:   Procedure Laterality Date    APPENDECTOMY      bladder lift      EXCISION OF MELANOMA Left 3/17/2022    Procedure: EXCISION, MELANOMA - Arm;  Surgeon: Travis Mejia MD;  Location: [facility] OR;  Service: General;  Laterality: Left;    GANGLION CYST EXCISION      HEMIARTHROPLASTY OF HIP Right 9/20/2021    Procedure: HEMIARTHROPLASTY, HIP;  Surgeon: Chau Garibay MD;  Location: [facility] OR;  Service: Orthopedics;  Laterality: Right;    HYSTERECTOMY      MASTECTOMY Left     TONSILLECTOMY       Current Outpatient Medications on File Prior to Visit   Medication Sig Dispense Refill    acetaminophen (TYLENOL) 325 MG tablet Take 650 mg by mouth every 6 (six) hours as needed for Pain or Temperature greater than.      alendronate (FOSAMAX) 70 MG tablet Take 1 tablet (70 mg total) by mouth every 7 days. 4 tablet 11    amitriptyline (ELAVIL) 50 MG tablet Take 1 tablet (50 mg total) by mouth every evening. 30 tablet 5    amLODIPine (NORVASC) 5 MG tablet TAKE 1 TABLET BY MOUTH DAILY 90 tablet 0    aspirin-acetaminophen-caffeine 250-250-65 mg (EXCEDRIN EXTRA STRENGTH) 250-250-65 mg per tablet Take 2 tablets by mouth Daily.      ELMIRON 100 mg Cap TAKE 1 CAPSULE(100 MG) BY MOUTH THREE TIMES DAILY (Patient taking differently: Take 100 mg by mouth every evening.) 90 capsule 11    irbesartan (AVAPRO) 150 MG tablet TAKE 1 TABLET BY MOUTH  EVERY DAY 90 tablet 0    levothyroxine (SYNTHROID) 150 MCG tablet TAKE 1 TABLET(150 MCG) BY MOUTH EVERY DAY (Patient taking differently: Take 150 mcg by mouth before breakfast.) 30 tablet 6    phenyleph/mineral oil/petrolat (PREPARATION H RECT) Place 1 application rectally daily as needed (Hemorrhoids).      pravastatin (PRAVACHOL) 20 MG tablet TAKE 1 TABLET(20 MG) BY MOUTH EVERY DAY 90 tablet 0    triamcinolone acetonide 0.1% (KENALOG) 0.1 % cream       xylitoL (XYLIMELTS) 550 mg MaBT Place 1 tablet inside cheek every 8 (eight) hours as needed (dry mouth). Place and Dissolve 1 tab in mouth in cheek      zolpidem (AMBIEN) 5 MG Tab TAKE 1 TABLET(5 MG) BY MOUTH EVERY NIGHT AS NEEDED FOR INSOMNIA 30 tablet 2     No current facility-administered medications on file prior to visit.     Review of patient's allergies indicates:   Allergen Reactions    Opioids - morphine analogues Hallucinations     Family History   Problem Relation Age of Onset    Cancer Father         rectal    Cancer Brother         mesothyleoma     Social History     Socioeconomic History    Marital status:    Tobacco Use    Smoking status: Former     Types: Cigarettes     Quit date:      Years since quittin.7    Smokeless tobacco: Never   Substance and Sexual Activity    Alcohol use: Yes     Comment: occasionally       Review of Systems:  Constitutional:  Denies fever or chills   Eyes:  Denies change in visual acuity   HENT:  Denies nasal congestion or sore throat   Respiratory:  Denies cough or shortness of breath   Cardiovascular:  Denies chest pain or edema   GI:  Denies abdominal pain, nausea, vomiting, bloody stools or diarrhea   :  Denies dysuria   Integument:  Denies rash   Neurologic:  Denies headache, focal weakness or sensory changes   Endocrine:  Denies polyuria or polydipsia   Lymphatic:  Denies swollen glands   Psychiatric:  Denies depression or anxiety     Physical Exam:   Constitutional:  Well developed, well nourished,  no acute distress, non-toxic appearance   Integument:  Well hydrated, no rash   Lymphatic:  No lymphadenopathy noted   Neurologic:  Alert & oriented x 3, CN 2-12 normal, normal motor function, normal sensory function, no focal deficits noted   Psychiatric:  Speech and behavior appropriate   Gi: abdomen soft  Eyes: EOMI    L hip  Exam performed same as contralateral side and is normal  R hip  FROM with no pain. Incision healed. Compartments soft. NVI distally. 4/5 quads.     X-rays were performed today, personally reviewed by me and findings discussed with the patient.  2 views of the right hip show implants intact in good position      Closed displaced fracture of right femoral neck      Continue as tolerated. RTC as needed.

## 2022-09-23 ENCOUNTER — CLINICAL SUPPORT (OUTPATIENT)
Dept: REHABILITATION | Facility: HOSPITAL | Age: 87
End: 2022-09-23
Payer: MEDICARE

## 2022-09-23 DIAGNOSIS — R29.898 BILATERAL LEG WEAKNESS: Primary | ICD-10-CM

## 2022-09-23 DIAGNOSIS — Z74.09 IMPAIRED FUNCTIONAL MOBILITY, BALANCE, GAIT, AND ENDURANCE: ICD-10-CM

## 2022-09-23 PROCEDURE — 97110 THERAPEUTIC EXERCISES: CPT | Mod: PN

## 2022-09-23 NOTE — PROGRESS NOTES
"[facility]   Physical Therapy Treatment     Name: Antonia Maldonado  Clinic Number: [id number]    Therapy Diagnosis:   Encounter Diagnoses   Name Primary?    Bilateral leg weakness Yes    Impaired functional mobility, balance, gait, and endurance        Physician: Remigio Granda II, MD    Visit Date: 9/23/2022    Physician Orders: PT Eval and Treat   Medical Diagnosis from Referral: R27.0 (ICD-10-CM) - Ataxia  Evaluation Date: 1/19/2022 (reassessed 5/6/2022 after lapse in treatment)  Authorization Period Expiration: 12/31/2022  Plan of Care Expiration: 9/30/2022  Visit # / Visits authorized: 14/ 20                                 Time In: 12:45 pm  Time Out: 1:30 pm  Total Billable Time:45 minutes  1:1 time: 45 minutes     Precautions: Fall and hard of hearing, HTN, osteopenia      SUBJECTIVE     Pt reports: She is feeling pretty good today. Did perform her HEP. Has some soreness to anterior R thigh when performs exercises but it does not last long. Has been walking around in her house without her walker. Is not currently driving yet but discussed with physician she can drive when she can lift her hip(into flexion) to shift between gas and brake.    She was not compliant with home exercise program.  Response to previous treatment: no changes  Functional change: Improved strength, reduced pain since initial evaluation,     Pain: no complaints of pain      OBJECTIVE     BP: 127/71     TUG: 10 sec without FWW  5x sit to stand: 14 seconds with UE support  30 second sit to stand: 11 times  Feet together, eyes open: 30 seconds, min sway  Feet together, eyes closed: 15 seconds, mod sway      Treatment     Antonia Beltrán received the treatments listed below:      therapeutic exercises to develop strength and endurance for 35 minutes including:    Reassessment     Supine alternating clam with yellow theraband x 3 minutes   Supine clam with yellow theraband (stay equal on the clam ROM) 5" hold x 3'   Supine " "clam with yellow theraband into bridges (stay equal on clam ROM) 5" holds x 3'   X 20 each seated bilateral lower extremity therapeutic exercise (2#) = marching with ER, mini march with External  Rotation into long  arc quad, seated External Rotation to tap opposite Lower Extremity shin    X 10 sit to stand with hands on thighs emphasizing proper technique   X 20 each shuttle mini squats and heel raises (37#)---np  Supine windshield wipers (Hip ER/IR)  Supine hip abd x 15  SL hip abd 2 x 8     NP:   Standing at counter top:   Hip External Rotation (Dorsiflexion foot, turning foot out and then back to neutral to relax) 2 x 10   Hip External Rotation (same as above) into hip abduction 2 x 10    neuromuscular re-education activities to improve: Balance and Proprioception for 00 minutes. The following activities were included:     X 1 minute stand on AirEx   X 15 standing mini squats on AirEx   X 15 alternating toe taps on 3-inch stool   2 x 15 feet each balance training with stand by assist/ contact guard assist = side stepping, backwards gait   X 10 stepping over 4-inch obstacle      gait training to improve functional mobility and safety for 10 minutes, including:      Functional ambulation 50 feet with SBA-CGA emphasizing heel strike contact in terminal swing and ER of R hip over uneven surface and up/down 6 inch steps with handrail      Patient Education and Home Exercises     Home Exercises Provided and Patient Education Provided     Education provided:    Supine alternating clam with yellow theraband x 3 minutes   Supine clam with yellow theraband (stay equal on the clam ROM) 5" hold x 3'   Supine clam with yellow theraband into bridges (stay equal on clam ROM) 5" holds x 3'   X 20 each seated bilateral lower extremity therapeutic exercise (2#) = marching with ER, mini march with External  Rotation into long  arc quad, seated External Rotation to tap opposite Lower Extremity shin      Standing at counter " top:   Hip External Rotation (Dorsiflexion foot, turning foot out and then back to neutral to relax) 2 x 10   Hip External Rotation (same as above) into hip abduction 2 x 10    Written Home Exercises Provided: Patient instructed to cont prior HEP.   Family wrote down and reviewed newer exercises today.      ASSESSMENT     Patient was reassessed by PT today. Demonstrates above average scoring when compared to norms for her gender and age with TUG and balance assessment. Continues to display slight in-toeing during gait on R side. She reports becoming more consistent with HEP 2-3 days a week. Discussed continuing exercises independently to progress hip strengthening and confidence with gait. Reviewed how to safely ascend/descend stairs with use of handrail and step to pattern when descending if no one is present in her home. Pt verbalized understanding and agreed. She is discharged from PT at this time.        Goals:     Short Term Goals:  4 weeks    (progressing)   1. Pt to demonstrate ascending stairs with reciprocal gait 4 steps x 5 without break to be able to ascend flight of stairs at home to enter master bedroom with single handrail and/or use of cane. (MET)  2. Increased hip MMT 1/2  to increase tolerance for ADL and work activities. (MET)  3. Pt to ambulate with use of single point cane or LRAD on variable surfaces without loss of balance for 400'. ( MET but hesitant without AD)  4. Pt to tolerate HEP to improve ROM and independence with ADL's. (PART MET)  5. Pt to report no difficulty with light household activities. (NOT MET)     Long Term Goals:  8 weeks       1.Pt to demonstrate ascending and descending stairs with reciprocal gait and use of single handrail to get to and from master bedroom independently. ( MET but discussed safety of descending with step to pattern)  2.Pt to demonstrate rotation of R hip equal to L to ambulate with decreased IR on R. (NOT MET)  3.Increased hip MMT 1 grade  to increase  tolerance for ADL and work activities. (Partially MET)  4. Pt will ambulate without AD on variable surfaces without loss of balance to return to prior level of function. (improved, but cautious)  5. Pt to demonstrate R hip flexion strength, plantar flexion strength, and glute strength of >/=4/5 to be able to return to driving tasks including pushing break and gas, and lifting leg between pedals without difficulty (MET)      PLAN     Pt is discharged from PT.      Cynthia Meier, PT

## 2022-09-26 NOTE — PLAN OF CARE
Outpatient Therapy Discharge Summary     Name: Antonia Maldonado  Clinic Number: [id number]    Therapy Diagnosis:   Encounter Diagnoses   Name Primary?    Bilateral leg weakness Yes    Impaired functional mobility, balance, gait, and endurance      Physician: Remigio Granda II, MD    Physician Orders: PT Eval and Treat   Medical Diagnosis from Referral: R27.0 (ICD-10-CM) - Ataxia  Evaluation Date: 1/19/2022 (reassessed 5/6/2022 after lapse in treatment)  Authorization Period Expiration: 12/31/2022      Date of Last visit: 9/23/2022  Total Visits Received: 14  Cancelled Visits: 1  No Show Visits: 0    Assessment      Patient was reassessed by PT today. Demonstrates above average scoring when compared to norms for her gender and age with TUG and balance assessment. Continues to display slight in-toeing during gait on R side. She reports becoming more consistent with HEP 2-3 days a week. Discussed continuing exercises independently to progress hip strengthening and confidence with gait. Reviewed how to safely ascend/descend stairs with use of handrail and step to pattern when descending if no one is present in her home. Pt verbalized understanding and agreed. She is discharged from PT at this time.    Goals:   Short Term Goals:  4 weeks    (progressing)   1. Pt to demonstrate ascending stairs with reciprocal gait 4 steps x 5 without break to be able to ascend flight of stairs at home to enter master bedroom with single handrail and/or use of cane. (MET)  2. Increased hip MMT 1/2  to increase tolerance for ADL and work activities. (MET)  3. Pt to ambulate with use of single point cane or LRAD on variable surfaces without loss of balance for 400'. ( MET but hesitant without AD)  4. Pt to tolerate HEP to improve ROM and independence with ADL's. (PART MET)  5. Pt to report no difficulty with light household activities. (NOT MET)     Long Term Goals:  8 weeks       1.Pt to demonstrate ascending and descending stairs with  reciprocal gait and use of single handrail to get to and from master bedroom independently. ( MET but discussed safety of descending with step to pattern)  2.Pt to demonstrate rotation of R hip equal to L to ambulate with decreased IR on R. (NOT MET)  3.Increased hip MMT 1 grade  to increase tolerance for ADL and work activities. (Partially MET)  4. Pt will ambulate without AD on variable surfaces without loss of balance to return to prior level of function. (improved, but cautious)  5. Pt to demonstrate R hip flexion strength, plantar flexion strength, and glute strength of >/=4/5 to be able to return to driving tasks including pushing break and gas, and lifting leg between pedals without difficulty (MET)    Discharge reason: Patient has reached the maximum rehab potential for the present time    Plan   This patient is discharged from Physical Therapy

## 2023-06-06 DIAGNOSIS — I73.9 IC (INTERMITTENT CLAUDICATION): ICD-10-CM

## 2023-06-06 RX ORDER — AMITRIPTYLINE HYDROCHLORIDE 50 MG/1
TABLET, FILM COATED ORAL
Qty: 90 TABLET | Refills: 1 | Status: SHIPPED | OUTPATIENT
Start: 2023-06-06 | End: 2024-01-22

## 2024-01-20 DIAGNOSIS — I73.9 IC (INTERMITTENT CLAUDICATION): ICD-10-CM

## 2024-01-22 RX ORDER — AMITRIPTYLINE HYDROCHLORIDE 50 MG/1
TABLET, FILM COATED ORAL
Qty: 90 TABLET | Refills: 1 | Status: SHIPPED | OUTPATIENT
Start: 2024-01-22

## 2024-02-14 PROBLEM — M79.89 LEFT ARM SWELLING: Status: ACTIVE | Noted: 2024-02-14

## 2024-02-14 PROBLEM — I89.0 LYMPHEDEMA: Status: ACTIVE | Noted: 2024-02-14

## 2024-02-14 PROBLEM — M25.512 LEFT SHOULDER PAIN: Status: ACTIVE | Noted: 2024-02-14

## 2024-03-01 PROBLEM — F33.2 SEVERE EPISODE OF RECURRENT MAJOR DEPRESSIVE DISORDER, WITHOUT PSYCHOTIC FEATURES: Status: ACTIVE | Noted: 2024-03-01

## 2024-03-01 PROBLEM — I73.9 IC (INTERMITTENT CLAUDICATION): Status: ACTIVE | Noted: 2024-03-01

## 2024-03-01 PROBLEM — F33.0 MILD RECURRENT MAJOR DEPRESSION: Status: ACTIVE | Noted: 2024-03-01

## 2024-07-25 DIAGNOSIS — I73.9 IC (INTERMITTENT CLAUDICATION): ICD-10-CM

## 2024-07-25 RX ORDER — AMITRIPTYLINE HYDROCHLORIDE 50 MG/1
TABLET, FILM COATED ORAL
Qty: 90 TABLET | Refills: 1 | Status: SHIPPED | OUTPATIENT
Start: 2024-07-25

## 2024-10-28 PROBLEM — S72.002A CLOSED LEFT HIP FRACTURE, INITIAL ENCOUNTER: Status: ACTIVE | Noted: 2024-10-28

## 2024-10-29 PROBLEM — R74.8 ELEVATED CPK: Status: ACTIVE | Noted: 2024-10-29

## 2024-10-30 PROBLEM — Z73.6 LIMITATION OF ACTIVITY DUE TO DISABILITY: Status: ACTIVE | Noted: 2024-10-30

## 2025-02-13 PROBLEM — I73.9 IC (INTERMITTENT CLAUDICATION): Status: RESOLVED | Noted: 2024-03-01 | Resolved: 2025-02-13

## 2025-02-13 PROBLEM — C43.62 MALIGNANT MELANOMA OF LEFT FOREARM: Status: RESOLVED | Noted: 2022-03-17 | Resolved: 2025-02-13

## 2025-02-13 PROBLEM — F01.A0 MILD VASCULAR DEMENTIA WITHOUT BEHAVIORAL DISTURBANCE, PSYCHOTIC DISTURBANCE, MOOD DISTURBANCE, OR ANXIETY: Status: ACTIVE | Noted: 2025-02-13

## 2025-02-13 PROBLEM — I27.20 PULMONARY HYPERTENSION: Status: ACTIVE | Noted: 2025-02-13

## 2025-02-13 PROBLEM — D69.6 THROMBOCYTOPENIA, UNSPECIFIED: Status: ACTIVE | Noted: 2025-02-13

## 2025-08-11 DIAGNOSIS — I73.9 IC (INTERMITTENT CLAUDICATION): ICD-10-CM

## 2025-08-11 RX ORDER — AMITRIPTYLINE HYDROCHLORIDE 50 MG/1
50 TABLET, FILM COATED ORAL NIGHTLY
Qty: 90 TABLET | Refills: 1 | Status: SHIPPED | OUTPATIENT
Start: 2025-08-11